# Patient Record
Sex: MALE | Race: WHITE | NOT HISPANIC OR LATINO | Employment: OTHER | URBAN - NONMETROPOLITAN AREA
[De-identification: names, ages, dates, MRNs, and addresses within clinical notes are randomized per-mention and may not be internally consistent; named-entity substitution may affect disease eponyms.]

---

## 2022-06-28 DIAGNOSIS — M54.2 NECK PAIN: ICD-10-CM

## 2022-06-28 DIAGNOSIS — M54.6 THORACIC SPINE PAIN: Primary | ICD-10-CM

## 2024-07-24 ENCOUNTER — LAB (OUTPATIENT)
Dept: LAB | Facility: HOSPITAL | Age: 64
End: 2024-07-24
Payer: COMMERCIAL

## 2024-07-24 ENCOUNTER — OFFICE VISIT (OUTPATIENT)
Dept: FAMILY MEDICINE CLINIC | Facility: CLINIC | Age: 64
End: 2024-07-24
Payer: COMMERCIAL

## 2024-07-24 VITALS
WEIGHT: 249.8 LBS | OXYGEN SATURATION: 97 % | BODY MASS INDEX: 33.11 KG/M2 | HEART RATE: 118 BPM | SYSTOLIC BLOOD PRESSURE: 120 MMHG | HEIGHT: 73 IN | DIASTOLIC BLOOD PRESSURE: 82 MMHG

## 2024-07-24 DIAGNOSIS — Z11.59 ENCOUNTER FOR HEPATITIS C SCREENING TEST FOR LOW RISK PATIENT: ICD-10-CM

## 2024-07-24 DIAGNOSIS — E55.9 VITAMIN D DEFICIENCY: ICD-10-CM

## 2024-07-24 DIAGNOSIS — Z13.89 SCREENING FOR BLOOD OR PROTEIN IN URINE: ICD-10-CM

## 2024-07-24 DIAGNOSIS — Z13.6 SCREENING FOR HYPERTENSION: ICD-10-CM

## 2024-07-24 DIAGNOSIS — E56.9 VITAMIN DEFICIENCY: ICD-10-CM

## 2024-07-24 DIAGNOSIS — Z13.0 SCREENING FOR DEFICIENCY ANEMIA: ICD-10-CM

## 2024-07-24 DIAGNOSIS — R53.83 LETHARGY: ICD-10-CM

## 2024-07-24 DIAGNOSIS — Z12.5 SCREENING PSA (PROSTATE SPECIFIC ANTIGEN): ICD-10-CM

## 2024-07-24 DIAGNOSIS — Z13.220 SCREENING FOR HYPERLIPIDEMIA: ICD-10-CM

## 2024-07-24 DIAGNOSIS — Z13.29 SCREENING FOR ENDOCRINE DISORDER: ICD-10-CM

## 2024-07-24 DIAGNOSIS — R53.83 LETHARGY: Primary | ICD-10-CM

## 2024-07-24 LAB
25(OH)D3 SERPL-MCNC: 44.3 NG/ML (ref 30–100)
ALBUMIN SERPL-MCNC: 4.9 G/DL (ref 3.5–5.2)
ALBUMIN/GLOB SERPL: 1.6 G/DL
ALP SERPL-CCNC: 82 U/L (ref 39–117)
ALT SERPL W P-5'-P-CCNC: 62 U/L (ref 1–41)
ANION GAP SERPL CALCULATED.3IONS-SCNC: 14 MMOL/L (ref 5–15)
AST SERPL-CCNC: 49 U/L (ref 1–40)
BASOPHILS # BLD AUTO: 0.03 10*3/MM3 (ref 0–0.2)
BASOPHILS NFR BLD AUTO: 0.5 % (ref 0–1.5)
BILIRUB SERPL-MCNC: 0.4 MG/DL (ref 0–1.2)
BILIRUB UR QL STRIP: NEGATIVE
BUN SERPL-MCNC: 22 MG/DL (ref 8–23)
BUN/CREAT SERPL: 15.4 (ref 7–25)
CALCIUM SPEC-SCNC: 10 MG/DL (ref 8.6–10.5)
CHLORIDE SERPL-SCNC: 102 MMOL/L (ref 98–107)
CHOLEST SERPL-MCNC: 185 MG/DL (ref 0–200)
CLARITY UR: CLEAR
CO2 SERPL-SCNC: 23 MMOL/L (ref 22–29)
COLOR UR: YELLOW
CREAT SERPL-MCNC: 1.43 MG/DL (ref 0.76–1.27)
CRP SERPL-MCNC: <0.3 MG/DL (ref 0–0.5)
DEPRECATED RDW RBC AUTO: 41.1 FL (ref 37–54)
EGFRCR SERPLBLD CKD-EPI 2021: 55.1 ML/MIN/1.73
EOSINOPHIL # BLD AUTO: 0.12 10*3/MM3 (ref 0–0.4)
EOSINOPHIL NFR BLD AUTO: 2.1 % (ref 0.3–6.2)
ERYTHROCYTE [DISTWIDTH] IN BLOOD BY AUTOMATED COUNT: 12.9 % (ref 12.3–15.4)
GLOBULIN UR ELPH-MCNC: 3.1 GM/DL
GLUCOSE SERPL-MCNC: 105 MG/DL (ref 65–99)
GLUCOSE UR STRIP-MCNC: NEGATIVE MG/DL
HBA1C MFR BLD: 6.1 % (ref 4.8–5.6)
HCT VFR BLD AUTO: 42.4 % (ref 37.5–51)
HCV AB SER QL: NORMAL
HDLC SERPL-MCNC: 55 MG/DL (ref 40–60)
HGB BLD-MCNC: 14.3 G/DL (ref 13–17.7)
HGB UR QL STRIP.AUTO: NEGATIVE
IMM GRANULOCYTES # BLD AUTO: 0.02 10*3/MM3 (ref 0–0.05)
IMM GRANULOCYTES NFR BLD AUTO: 0.3 % (ref 0–0.5)
KETONES UR QL STRIP: NEGATIVE
LDLC SERPL CALC-MCNC: 105 MG/DL (ref 0–100)
LDLC/HDLC SERPL: 1.84 {RATIO}
LEUKOCYTE ESTERASE UR QL STRIP.AUTO: NEGATIVE
LYMPHOCYTES # BLD AUTO: 1.85 10*3/MM3 (ref 0.7–3.1)
LYMPHOCYTES NFR BLD AUTO: 32.1 % (ref 19.6–45.3)
MCH RBC QN AUTO: 30 PG (ref 26.6–33)
MCHC RBC AUTO-ENTMCNC: 33.7 G/DL (ref 31.5–35.7)
MCV RBC AUTO: 88.9 FL (ref 79–97)
MONOCYTES # BLD AUTO: 0.69 10*3/MM3 (ref 0.1–0.9)
MONOCYTES NFR BLD AUTO: 12 % (ref 5–12)
NEUTROPHILS NFR BLD AUTO: 3.06 10*3/MM3 (ref 1.7–7)
NEUTROPHILS NFR BLD AUTO: 53 % (ref 42.7–76)
NITRITE UR QL STRIP: NEGATIVE
NRBC BLD AUTO-RTO: 0 /100 WBC (ref 0–0.2)
PH UR STRIP.AUTO: 5.5 [PH] (ref 5–8)
PLATELET # BLD AUTO: 234 10*3/MM3 (ref 140–450)
PMV BLD AUTO: 10.8 FL (ref 6–12)
POTASSIUM SERPL-SCNC: 4.5 MMOL/L (ref 3.5–5.2)
PROT SERPL-MCNC: 8 G/DL (ref 6–8.5)
PROT UR QL STRIP: ABNORMAL
PSA SERPL-MCNC: 7.98 NG/ML (ref 0–4)
RBC # BLD AUTO: 4.77 10*6/MM3 (ref 4.14–5.8)
SODIUM SERPL-SCNC: 139 MMOL/L (ref 136–145)
SP GR UR STRIP: 1.02 (ref 1–1.03)
TESTOST SERPL-MCNC: 118 NG/DL (ref 193–740)
TRIGL SERPL-MCNC: 143 MG/DL (ref 0–150)
TSH SERPL DL<=0.05 MIU/L-ACNC: 1.75 UIU/ML (ref 0.27–4.2)
URATE SERPL-MCNC: 6.8 MG/DL (ref 3.4–7)
UROBILINOGEN UR QL STRIP: ABNORMAL
VIT B12 BLD-MCNC: 496 PG/ML (ref 211–946)
VLDLC SERPL-MCNC: 25 MG/DL (ref 5–40)
WBC NRBC COR # BLD AUTO: 5.77 10*3/MM3 (ref 3.4–10.8)

## 2024-07-24 PROCEDURE — 82306 VITAMIN D 25 HYDROXY: CPT

## 2024-07-24 PROCEDURE — G0103 PSA SCREENING: HCPCS

## 2024-07-24 PROCEDURE — 83036 HEMOGLOBIN GLYCOSYLATED A1C: CPT

## 2024-07-24 PROCEDURE — 82607 VITAMIN B-12: CPT

## 2024-07-24 PROCEDURE — 84550 ASSAY OF BLOOD/URIC ACID: CPT

## 2024-07-24 PROCEDURE — 81003 URINALYSIS AUTO W/O SCOPE: CPT

## 2024-07-24 PROCEDURE — 99204 OFFICE O/P NEW MOD 45 MIN: CPT | Performed by: FAMILY MEDICINE

## 2024-07-24 PROCEDURE — 86803 HEPATITIS C AB TEST: CPT

## 2024-07-24 PROCEDURE — 80050 GENERAL HEALTH PANEL: CPT

## 2024-07-24 PROCEDURE — 86140 C-REACTIVE PROTEIN: CPT

## 2024-07-24 PROCEDURE — 80061 LIPID PANEL: CPT

## 2024-07-24 PROCEDURE — 84403 ASSAY OF TOTAL TESTOSTERONE: CPT

## 2024-07-24 RX ORDER — PANTOPRAZOLE SODIUM 40 MG/1
40 TABLET, DELAYED RELEASE ORAL DAILY
COMMUNITY

## 2024-07-24 RX ORDER — VENLAFAXINE HYDROCHLORIDE 75 MG/1
75 CAPSULE, EXTENDED RELEASE ORAL
COMMUNITY
Start: 2024-07-07

## 2024-07-24 RX ORDER — ROSUVASTATIN CALCIUM 10 MG/1
TABLET, COATED ORAL
COMMUNITY
Start: 2024-06-23

## 2024-07-24 RX ORDER — DICYCLOMINE HCL 20 MG
20 TABLET ORAL EVERY 6 HOURS
COMMUNITY

## 2024-07-24 NOTE — PROGRESS NOTES
New Patient Office Visit      Patient Name: Manuel Pineda  : 1960   MRN: [id number]     Chief Complaint:    Chief Complaint   Patient presents with    Establish Care    Fatigue     Pt. States he has noticed for the last month Tried Off and On.    Back Pain     X 2 days       Subjective   History of Present Illness    History of Present Illness: Manuel Pineda is a 64 y.o. male who is here today to establish care.    Previous primary care: Aleksey Shah MD    Chronic health conditions:  HLD: Rosuvastatin 10mg daily  HTN: Lisinopril 10mg. Alcohol 3-4 days per week, 2 drinks per occasion.  GERD: H/O Bender's esophagus  Elevated PSA: Mild, sees urology. Had ultrasound and biopsy years ago, normal.  Low back pain: Acute on chronic, previously saw Dr. Kumari in 2016  GI Issues: Hx of Bender's esophagus (pantoprazole), recent EGD looked normal. IBS, Had colonoscopy in May with Dr. Villanueva.  Anxiety with mild depression: Stable, does feel like he has plateaued a little bit. Venlafaxine XR 75mg daily, started about a year and a half ago (does correlate with before his symptoms started). Self medicates occasionally with THC. Previously on lexapro and cymbalta.  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  Prediabetes: A1c 5.7% previously    Other physicians currently involved in patient's care:  Patient Care Team:  Dallin Loza DO as PCP - General (Family Medicine)  Don Lucio MD (Urology)  Fred Villanueva MD as Consulting Physician (Gastroenterology)    Acute Concerns:  About a month ago he was trimming a hedge at his house, got very tired and had to sit down in his garage, took him a few days to fully recover. Eventually got back to neutral until this past weekend he was [place] the [place] with his kids and he could barely get up into the boat. He went to [facility] after the incident at his house, was sent to [facility] was given  fluids for dehydration, evidently everything else was normal.    C4 radiculopathy, saw a neurologist about a year or two ago. Bilateral but R>L. Saw an ENT in [place] a couple years ago for neck pain. Dr. Del Toro's office.    This patient is accompanied by their self who contributes to the history of their care.    The following portions of the patient's history were reviewed and updated as appropriate: allergies, current medications, past family history, past medical history, past social history, past surgical history and problem list.    Subjective      Review of Systems:   Review of Systems - See HPI and new patient paperwork scanned into chart    Past Medical History:   Past Medical History:   Diagnosis Date    Anxiety     Degenerative arthritis of left knee     Injection at ortho years ago    GERD (gastroesophageal reflux disease)     HLD (hyperlipidemia)     Hypertension        Past Surgical History:   Past Surgical History:   Procedure Laterality Date    APPENDECTOMY      TONSILLECTOMY         Family History:   Family History   Problem Relation Age of Onset    Multiple myeloma Mother     Hypertension Father     Skin cancer Father        Social History:   Social History     Socioeconomic History    Marital status: Single   Tobacco Use    Smoking status: Former     Current packs/day: 0.00     Average packs/day: 1 pack/day for 19.0 years (19.0 ttl pk-yrs)     Types: Cigarettes     Start date:      Quit date:      Years since quittin.6     Passive exposure: Never    Smokeless tobacco: Never   Vaping Use    Vaping status: Never Used   Substance and Sexual Activity    Alcohol use: Yes     Comment: Moderate    Drug use: No    Sexual activity: Defer       Tobacco History:   Social History     Tobacco Use   Smoking Status Former    Current packs/day: 0.00    Average packs/day: 1 pack/day for 19.0 years (19.0 ttl pk-yrs)    Types: Cigarettes    Start date:     Quit date: 2000    Years since quitting:  "24.6    Passive exposure: Never   Smokeless Tobacco Never       Medications:     Current Outpatient Medications:     ALPRAZolam (XANAX) 0.5 MG tablet, Take 1 tablet by mouth 2 (Two) Times a Day As Needed for Anxiety., Disp: , Rfl:     dicyclomine (BENTYL) 20 MG tablet, Take 1 tablet by mouth Every 6 (Six) Hours., Disp: , Rfl:     lisinopril (PRINIVIL,ZESTRIL) 10 MG tablet, Take 1 tablet by mouth Daily., Disp: , Rfl:     pantoprazole (PROTONIX) 40 MG EC tablet, Take 1 tablet by mouth Daily., Disp: , Rfl:     rosuvastatin (CRESTOR) 10 MG tablet, , Disp: , Rfl:     venlafaxine XR (EFFEXOR-XR) 75 MG 24 hr capsule, 1 capsule., Disp: , Rfl:     Allergies:   Allergies   Allergen Reactions    Hydrocodone      Itching       Objective   Objective     Physical Exam:  Vital Signs:   Vitals:    07/24/24 1012   BP: 120/82   Pulse: 118   SpO2: 97%   Weight: 113 kg (249 lb 12.8 oz)   Height: 185.4 cm (72.99\")   PainSc: 0-No pain     Body mass index is 32.96 kg/m².     Physical Exam  Nursing note reviewed  Const: NAD, A&Ox4, Pleasant, Cooperative  Eyes: EOMI, no conjunctivitis  ENT: No nasal discharge present, neck supple  Cardiac: Regular rate and rhythm, no cyanosis  Resp: Respiratory rate within normal limits, no increased work of breathing, no audible wheezing or retractions noted  GI: No distention or ascites  MSK: Motor and sensation grossly intact in bilateral upper extremities  Neurologic: CN II-XII grossly intact  Psych: Appropriate mood and behavior.  Skin: Warm, dry  Procedures/Radiology     Procedures  No radiology results for the last 7 days     Assessment & Plan   Assessment / Plan      Assessment/Plan:   Problems Addressed This Visit  Diagnoses and all orders for this visit:    1. Lethargy (Primary)  -     Lipid Panel; Future  -     Hemoglobin A1c; Future  -     Comprehensive Metabolic Panel; Future  -     CBC & Differential; Future  -     Urinalysis With Microscopic If Indicated (No Culture) - Urine, Clean Catch; " Future  -     TSH Rfx On Abnormal To Free T4; Future  -     Testosterone; Future  -     Uric Acid; Future  -     Vitamin D,25-Hydroxy; Future  -     Vitamin B12; Future  -     C-reactive Protein; Future    2. Screening for hyperlipidemia  -     Lipid Panel; Future    3. Screening for endocrine disorder  -     Hemoglobin A1c; Future  -     Comprehensive Metabolic Panel; Future  -     TSH Rfx On Abnormal To Free T4; Future  -     Testosterone; Future    4. Screening for deficiency anemia  -     CBC & Differential; Future    5. Screening for blood or protein in urine  -     Urinalysis With Microscopic If Indicated (No Culture) - Urine, Clean Catch; Future    6. Screening for hypertension  -     Uric Acid; Future    7. Screening PSA (prostate specific antigen)  -     PSA Screen; Future    8. Encounter for hepatitis C screening test for low risk patient  -     Hepatitis C Antibody; Future    9. Vitamin D deficiency  -     Vitamin D,25-Hydroxy; Future    10. Vitamin deficiency  -     Vitamin B12; Future      Problem List Items Addressed This Visit    None  Visit Diagnoses       Lethargy    -  Primary    Relevant Orders    Lipid Panel (Completed)    Hemoglobin A1c (Completed)    Comprehensive Metabolic Panel (Completed)    CBC & Differential (Completed)    Urinalysis With Microscopic If Indicated (No Culture) - Urine, Clean Catch (Completed)    TSH Rfx On Abnormal To Free T4 (Completed)    Testosterone (Completed)    Uric Acid (Completed)    Vitamin D,25-Hydroxy (Completed)    Vitamin B12 (Completed)    C-reactive Protein (Completed)    Screening for hyperlipidemia        Relevant Orders    Lipid Panel (Completed)    Screening for endocrine disorder        Relevant Orders    Hemoglobin A1c (Completed)    Comprehensive Metabolic Panel (Completed)    TSH Rfx On Abnormal To Free T4 (Completed)    Testosterone (Completed)    Screening for deficiency anemia        Relevant Orders    CBC & Differential (Completed)    Screening  for blood or protein in urine        Relevant Orders    Urinalysis With Microscopic If Indicated (No Culture) - Urine, Clean Catch (Completed)    Screening for hypertension        Relevant Orders    Uric Acid (Completed)    Screening PSA (prostate specific antigen)        Relevant Orders    PSA Screen (Completed)    Encounter for hepatitis C screening test for low risk patient        Relevant Orders    Hepatitis C Antibody (Completed)    Vitamin D deficiency        Relevant Orders    Vitamin D,25-Hydroxy (Completed)    Vitamin deficiency        Relevant Orders    Vitamin B12 (Completed)            We will plan to obtain previous records both for chronic preventative care as well as those related to the current episode of care.  Any records that the patient brought with him today were reviewed personally by me during the visit today and will be scanned into the chart for posterity.    Discussed the nature of the disease including relevant anatomy & expected clinical course, risks, complications, implications, management, safe and proper use of medications. Plan of care reviewed with patient at the conclusion of today's visit. Education was provided regarding diagnosis and management.  Patient verbalizes understanding of and agreement with management plan. Encouraged therapeutic lifestyle changes including low calorie diet with plenty of fruits and vegetables, daily exercise, medication compliance, and keeping scheduled follow up appointments with me and any other providers. Encouraged patient to have appointment for complete physical, fasting labs, appropriate screenings, and immunizations on an annual basis. Discussed extended office hours, shared call, and appropriate use of the ER. Discussed generally we do not prescribe chronic controlled substances from this office. Appropriate referrals will be made to pain management and psychiatry if needed. Stressed the importance and expectation of medical compliance with  plan of care, medications, and follow up appointments.    There are no Patient Instructions on file for this visit.    Follow Up:   Return in about 2 weeks (around 8/7/2024) for lab review.        DO KACI Billings RD  [facility]  [address]  [facility] [address]  Fax [phone]  Phone [phone]

## 2024-07-26 ENCOUNTER — PATIENT ROUNDING (BHMG ONLY) (OUTPATIENT)
Dept: FAMILY MEDICINE CLINIC | Facility: CLINIC | Age: 64
End: 2024-07-26
Payer: COMMERCIAL

## 2024-08-12 ENCOUNTER — OFFICE VISIT (OUTPATIENT)
Dept: FAMILY MEDICINE CLINIC | Facility: CLINIC | Age: 64
End: 2024-08-12
Payer: COMMERCIAL

## 2024-08-12 VITALS
SYSTOLIC BLOOD PRESSURE: 116 MMHG | OXYGEN SATURATION: 97 % | HEIGHT: 73 IN | BODY MASS INDEX: 32.84 KG/M2 | DIASTOLIC BLOOD PRESSURE: 80 MMHG | HEART RATE: 88 BPM | WEIGHT: 247.8 LBS

## 2024-08-12 DIAGNOSIS — E66.09 CLASS 1 OBESITY DUE TO EXCESS CALORIES WITH SERIOUS COMORBIDITY AND BODY MASS INDEX (BMI) OF 32.0 TO 32.9 IN ADULT: ICD-10-CM

## 2024-08-12 DIAGNOSIS — R53.83 LETHARGY: Primary | ICD-10-CM

## 2024-08-12 DIAGNOSIS — N41.0 ACUTE PROSTATITIS: ICD-10-CM

## 2024-08-12 DIAGNOSIS — R79.89 LOW SERUM TESTOSTERONE LEVEL IN MALE: ICD-10-CM

## 2024-08-12 DIAGNOSIS — M25.511 ACUTE PAIN OF RIGHT SHOULDER: ICD-10-CM

## 2024-08-12 DIAGNOSIS — R79.89 ELEVATED SERUM CREATININE: ICD-10-CM

## 2024-08-12 DIAGNOSIS — R74.8 ELEVATED LIVER ENZYMES: ICD-10-CM

## 2024-08-12 PROCEDURE — 99214 OFFICE O/P EST MOD 30 MIN: CPT | Performed by: FAMILY MEDICINE

## 2024-08-12 RX ORDER — VIT C/B6/B5/MAGNESIUM/HERB 173 50-5-6-5MG
500 CAPSULE ORAL 3 TIMES DAILY
Qty: 90 CAPSULE | Refills: 11 | Status: SHIPPED | OUTPATIENT
Start: 2024-08-12 | End: 2024-08-19

## 2024-08-12 NOTE — PATIENT INSTRUCTIONS
See nutritionist for weight loss  Avoid alcohol, hydrate, and have labs rechecked in 1 month  Have testosterone checked first thing in the morning next week  If the weight isn't coming down in the next 6 weeks we can try Wegovy or Zepbound

## 2024-08-12 NOTE — PROGRESS NOTES
Established Patient Office Visit      Patient Name: Manuel Pineda  : 1960   MRN: [id number]   Care Team: Patient Care Team:  Dallin Loza DO as PCP - General (Family Medicine)  Don Lucio MD (Urology)  Fred Villanueva MD as Consulting Physician (Gastroenterology)  Migel Vargas MD as Surgeon (Orthopedic Surgery)    Chief Complaint:    Chief Complaint   Patient presents with    Hypertension       History of Present Illness: Manuel Pineda is a 64 y.o. male who is here today for chief complaint.    HPI    Follow-up on the labs he had completed after establishing care.    Chronic health conditions:  HLD: Rosuvastatin 10mg daily  HTN: Lisinopril 10mg. Alcohol 3-4 days per week, 2 drinks per occasion.  GERD: H/O Bender's esophagus  Elevated PSA: Mild, sees urology. Had ultrasound and biopsy years ago, normal.  Low back pain: Acute on chronic, previously saw Dr. Kumari in   GI Issues: Hx of Bender's esophagus (pantoprazole), recent EGD looked normal. IBS, Had colonoscopy in May with Dr. Villanueva.  Anxiety with mild depression: Stable, does feel like he has plateaued a little bit. Venlafaxine XR 75mg daily, started about a year and a half ago (does correlate with before his symptoms started). Self medicates occasionally with THC. Previously on lexapro and cymbalta.  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  Prediabetes: A1c 5.7% previously    He had fasting labs completed which showed  An elevated PSA to 7.98--he saw his urologist for possible episode of prostatitis, prescribed Cipro twice daily for 21 days  Very low testosterone at 118  Trace proteinuria  Hemoglobin A1c 6.1%, up from his previous level of 5.7% (fasting glucose 105)  Elevated creatinine to 1.43  Elevated ALT T and AST 62/49 with normal liver enzymes otherwise    Had EGD colonoscopy May 2024 with Dr. Villanueva    This patient is accompanied by their  self who contributes to the history of their care.    The following portions of the patient's history were reviewed and updated as appropriate: allergies, current medications, past family history, past medical history, past social history, past surgical history and problem list.    Subjective      Review of Systems:   Review of Systems - See HPI    Past Medical History:   Past Medical History:   Diagnosis Date    Anxiety     Arthritis of back 2015    Cervical disc disorder     Degenerative arthritis of left knee     Injection at ortho years ago    GERD (gastroesophageal reflux disease)     HLD (hyperlipidemia)     Hypertension     Lumbosacral disc disease        Past Surgical History:   Past Surgical History:   Procedure Laterality Date    APPENDECTOMY      TONSILLECTOMY         Family History:   Family History   Problem Relation Age of Onset    Multiple myeloma Mother     Hypertension Father     Skin cancer Father        Social History:   Social History     Socioeconomic History    Marital status: Single   Tobacco Use    Smoking status: Former     Current packs/day: 0.00     Average packs/day: 1 pack/day for 19.0 years (19.0 ttl pk-yrs)     Types: Cigarettes     Start date: 1981     Quit date:      Years since quittin.6     Passive exposure: Never    Smokeless tobacco: Never   Vaping Use    Vaping status: Never Used   Substance and Sexual Activity    Alcohol use: Not Currently     Comment: Moderate    Drug use: No    Sexual activity: Defer       Tobacco History:   Social History     Tobacco Use   Smoking Status Former    Current packs/day: 0.00    Average packs/day: 1 pack/day for 19.0 years (19.0 ttl pk-yrs)    Types: Cigarettes    Start date: 1981    Quit date:     Years since quittin.6    Passive exposure: Never   Smokeless Tobacco Never       Medications:   Outpatient Medications Prior to Visit   Medication Sig Dispense Refill    ALPRAZolam (XANAX) 0.5 MG tablet Take 1 tablet  "by mouth 2 (Two) Times a Day As Needed for Anxiety.      dicyclomine (BENTYL) 20 MG tablet Take 1 tablet by mouth Every 6 (Six) Hours.      lisinopril (PRINIVIL,ZESTRIL) 10 MG tablet Take 1 tablet by mouth Daily.      pantoprazole (PROTONIX) 40 MG EC tablet Take 1 tablet by mouth Daily.      rosuvastatin (CRESTOR) 10 MG tablet       venlafaxine XR (EFFEXOR-XR) 75 MG 24 hr capsule 1 capsule.       No facility-administered medications prior to visit.        Allergies:   Allergies   Allergen Reactions    Hydrocodone      Itching       Objective   Objective     Physical Exam:  Vital Signs:   Vitals:    08/12/24 1302   BP: 116/80   Pulse: 88   SpO2: 97%   Weight: 112 kg (247 lb 12.8 oz)   Height: 185.4 cm (72.99\")   PainSc: 0-No pain     Body mass index is 32.7 kg/m².     Physical Exam  Nursing note reviewed  Const: NAD, A&Ox4, Pleasant, Cooperative  Eyes: EOMI, no conjunctivitis  ENT: No nasal discharge present, neck supple  Cardiac: Regular rate and rhythm, no cyanosis  Resp: Respiratory rate within normal limits, no increased work of breathing, no audible wheezing or retractions noted  GI: No distention or ascites  MSK: Motor and sensation grossly intact in bilateral upper extremities  Neurologic: CN II-XII grossly intact  Psych: Appropriate mood and behavior.  Skin: Warm, dry  Procedures/Radiology     Procedures  No radiology results for the last 7 days     Assessment & Plan   Assessment / Plan      Assessment/Plan:   Problems Addressed This Visit  Diagnoses and all orders for this visit:    1. Lethargy (Primary)    2. Elevated serum creatinine  -     Comprehensive Metabolic Panel; Future  -     Creatinine Urine Random (kidney function) GFR component - Urine, Clean Catch; Future  -     Sodium, Urine, Random - Urine, Clean Catch; Future  -     Osmolality, Urine - Urine, Clean Catch; Future  -     Osmolality, Serum; Future    3. Low serum testosterone level in male  -     Testosterone, Free, Total; Future    4. Class 1 " obesity due to excess calories with serious comorbidity and body mass index (BMI) of 32.0 to 32.9 in adult  -     Ambulatory Referral to Nutrition Services    5. Acute prostatitis    6. Elevated liver enzymes    7. Acute pain of right shoulder  -     Ambulatory Referral to Orthopedic Surgery  -     Discontinue: Turmeric 500 MG capsule; Take 1 capsule by mouth 3 times a day. For inflammation/pain  Dispense: 90 capsule; Refill: 11      Problem List Items Addressed This Visit    None  Visit Diagnoses       Lethargy    -  Primary    Elevated serum creatinine        Relevant Orders    Comprehensive Metabolic Panel    Creatinine Urine Random (kidney function) GFR component - Urine, Clean Catch    Sodium, Urine, Random - Urine, Clean Catch    Osmolality, Urine - Urine, Clean Catch    Osmolality, Serum    Low serum testosterone level in male        Relevant Orders    Testosterone, Free, Total    Class 1 obesity due to excess calories with serious comorbidity and body mass index (BMI) of 32.0 to 32.9 in adult        Relevant Orders    Ambulatory Referral to Nutrition Services    Acute prostatitis        Elevated liver enzymes        Acute pain of right shoulder        Relevant Orders    Ambulatory Referral to Orthopedic Surgery          Orders Placed This Encounter   Procedures    Testosterone, Free, Total     Standing Status:   Future     Standing Expiration Date:   8/12/2025     Order Specific Question:   Release to patient     Answer:   Routine Release [[id number]]    Comprehensive Metabolic Panel     Standing Status:   Future     Standing Expiration Date:   8/12/2025     Order Specific Question:   Release to patient     Answer:   Routine Release [[id number]]    Creatinine Urine Random (kidney function) GFR component - Urine, Clean Catch     Standing Status:   Future     Standing Expiration Date:   8/12/2025     Order Specific Question:   Release to patient     Answer:   Routine Release [[id number]]    Sodium, Urine,  Random - Urine, Clean Catch     Standing Status:   Future     Standing Expiration Date:   8/12/2025     Order Specific Question:   Release to patient     Answer:   Routine Release [[id number]]    Osmolality, Urine - Urine, Clean Catch     Standing Status:   Future     Standing Expiration Date:   8/12/2025     Order Specific Question:   Release to patient     Answer:   Routine Release [[id number]]    Osmolality, Serum     Standing Status:   Future     Standing Expiration Date:   8/12/2025     Order Specific Question:   Release to patient     Answer:   Routine Release [[id number]]    Ambulatory Referral to Nutrition Services     Referral Priority:   Routine     Referral Type:   Health Education     Referral Reason:   Specialty Services Required     Number of Visits Requested:   1    Ambulatory Referral to Orthopedic Surgery     Referral Priority:   Routine     Referral Type:   Consultation     Referral Reason:   Specialty Services Required     Requested Specialty:   Orthopedic Surgery     Number of Visits Requested:   1         Patient Instructions   See nutritionist for weight loss  Avoid alcohol, hydrate, and have labs rechecked in 1 month  Have testosterone checked first thing in the morning next week  If the weight isn't coming down in the next 6 weeks we can try Wegovy or Zepbound    Follow Up:   Return in about 6 weeks (around 9/23/2024) for weight loss.        DO KACI Billings RD  [facility]  [address] [facility] [phone]  Fax [phone]  Phone [phone]    Disclaimer to patients: The 21st Century Cares Act makes medical notes like these available to patients in the interest of transparency. However, please be advised that this is still a medical document. It is intended as [id number] communication. Many sections may include medical language or jargon, abbreviations, and additional verbiage that are unfamiliar or  confusing. In some ways it may come across as blunt, direct, or may be summarized in order to clearly and concisely communicate the most crucial information to medical professionals. It may also include mentions of conditions that are unlikely but considered as part of the differential diagnosis, including serious disorders. These are not always discussed at length at the time of appointment because their likelihood is so low, but may be included in a medical note to make it clear what has been considered and/or ruled out as part of a work-up. Medical documents are intended to carry relevant information, facts as evident, and the personal clinical opinion of the physician. If you have any questions regarding this medical document, please bring them to the attention of the physician at your next scheduled appointment.

## 2024-08-19 ENCOUNTER — OFFICE VISIT (OUTPATIENT)
Age: 64
End: 2024-08-19
Payer: COMMERCIAL

## 2024-08-19 VITALS
WEIGHT: 242.9 LBS | BODY MASS INDEX: 32.19 KG/M2 | DIASTOLIC BLOOD PRESSURE: 76 MMHG | HEIGHT: 73 IN | SYSTOLIC BLOOD PRESSURE: 110 MMHG

## 2024-08-19 DIAGNOSIS — M50.30 DEGENERATIVE CERVICAL DISC: ICD-10-CM

## 2024-08-19 DIAGNOSIS — M25.511 RIGHT SHOULDER PAIN, UNSPECIFIED CHRONICITY: Primary | ICD-10-CM

## 2024-08-19 DIAGNOSIS — M75.81 RIGHT ROTATOR CUFF TENDINITIS: ICD-10-CM

## 2024-08-19 PROCEDURE — 99204 OFFICE O/P NEW MOD 45 MIN: CPT | Performed by: STUDENT IN AN ORGANIZED HEALTH CARE EDUCATION/TRAINING PROGRAM

## 2024-08-19 PROCEDURE — 20610 DRAIN/INJ JOINT/BURSA W/O US: CPT | Performed by: STUDENT IN AN ORGANIZED HEALTH CARE EDUCATION/TRAINING PROGRAM

## 2024-08-19 RX ORDER — LIDOCAINE HYDROCHLORIDE 10 MG/ML
4 INJECTION, SOLUTION EPIDURAL; INFILTRATION; INTRACAUDAL; PERINEURAL
Status: COMPLETED | OUTPATIENT
Start: 2024-08-19 | End: 2024-08-19

## 2024-08-19 RX ORDER — TRIAMCINOLONE ACETONIDE 40 MG/ML
80 INJECTION, SUSPENSION INTRA-ARTICULAR; INTRAMUSCULAR
Status: COMPLETED | OUTPATIENT
Start: 2024-08-19 | End: 2024-08-19

## 2024-08-19 RX ORDER — BUPIVACAINE HYDROCHLORIDE 5 MG/ML
4 INJECTION, SOLUTION EPIDURAL; INTRACAUDAL
Status: COMPLETED | OUTPATIENT
Start: 2024-08-19 | End: 2024-08-19

## 2024-08-19 RX ADMIN — LIDOCAINE HYDROCHLORIDE 4 ML: 10 INJECTION, SOLUTION EPIDURAL; INFILTRATION; INTRACAUDAL; PERINEURAL at 15:20

## 2024-08-19 RX ADMIN — TRIAMCINOLONE ACETONIDE 80 MG: 40 INJECTION, SUSPENSION INTRA-ARTICULAR; INTRAMUSCULAR at 15:20

## 2024-08-19 RX ADMIN — BUPIVACAINE HYDROCHLORIDE 4 ML: 5 INJECTION, SOLUTION EPIDURAL; INTRACAUDAL at 15:20

## 2024-08-19 NOTE — PROGRESS NOTES
Procedure   - Large Joint Arthrocentesis: R subacromial bursa on 8/19/2024 3:20 PM  Indications: pain  Details: 21 G needle, posterior approach  Medications: 4 mL lidocaine PF 1% 1 %; 4 mL bupivacaine (PF) 0.5 %; 80 mg triamcinolone acetonide 40 MG/ML  Outcome: tolerated well, no immediate complications  Procedure, treatment alternatives, risks and benefits explained, specific risks discussed. Consent was given by the patient. Immediately prior to procedure a time out was called to verify the correct patient, procedure, equipment, support staff and site/side marked as required. Patient was prepped and draped in the usual sterile fashion.

## 2024-08-19 NOTE — PROGRESS NOTES
[facility] [place] [facility] [place]     Office Visit       Date: 08/19/2024   Patient Name: Manuel Pineda  MRN: [id number]  YOB: 1960    Referring Physician: Dallin Loza DO     Chief Complaint:   Chief Complaint   Patient presents with    Right Shoulder - Pain       History of Present Illness:   Manuel Pineda is a 64 y.o. male who presents with new problem of: right shoulder pain.  Onset: atraumatic and gradual in nature. The issue has been ongoing for 2 year(s). Pain is a 5/10 on the pain scale. Pain is described as dull, aching, throbbing, and stabbing. Associated symptoms include pain, popping, and giving way/buckling. The pain is worse with sleeping and lying on affected side; ice and pain medication and/or NSAID improve the pain. Previous treatments have included: NSAIDS and physical therapy.    Subjective   Review of Systems: Review of Systems   Constitutional:  Negative for chills, fever, unexpected weight gain and unexpected weight loss.   HENT:  Negative for congestion, postnasal drip and rhinorrhea.    Eyes:  Negative for blurred vision.   Respiratory:  Negative for shortness of breath.    Cardiovascular:  Negative for leg swelling.   Gastrointestinal:  Negative for abdominal pain, nausea and vomiting.   Genitourinary:  Negative for difficulty urinating.   Musculoskeletal:  Positive for arthralgias. Negative for gait problem, joint swelling and myalgias.   Skin:  Negative for skin lesions and wound.   Neurological:  Negative for dizziness, weakness, light-headedness and numbness.   Hematological:  Does not bruise/bleed easily.   Psychiatric/Behavioral:  Negative for depressed mood.         I have reviewed the following portions of the patient's history:History of Present Illness and review of systems.    Past Medical History:   Past Medical History:   Diagnosis Date    Anxiety     Arthritis of back 2015    Cervical disc disorder 2022     Degenerative arthritis of left knee     Injection at ortho years ago    GERD (gastroesophageal reflux disease)     HLD (hyperlipidemia)     Hypertension     Lumbosacral disc disease        Past Surgical History:   Past Surgical History:   Procedure Laterality Date    APPENDECTOMY      TONSILLECTOMY         Family History:   Family History   Problem Relation Age of Onset    Multiple myeloma Mother     Hypertension Father     Skin cancer Father        Social History:   Social History     Socioeconomic History    Marital status: Single   Tobacco Use    Smoking status: Former     Current packs/day: 0.00     Average packs/day: 1 pack/day for 19.0 years (19.0 ttl pk-yrs)     Types: Cigarettes     Start date: 1981     Quit date:      Years since quittin.6     Passive exposure: Never    Smokeless tobacco: Never   Vaping Use    Vaping status: Never Used   Substance and Sexual Activity    Alcohol use: Not Currently     Comment: Moderate    Drug use: No    Sexual activity: Defer       Medications:   Current Outpatient Medications:     ALPRAZolam (XANAX) 0.5 MG tablet, Take 1 tablet by mouth 2 (Two) Times a Day As Needed for Anxiety., Disp: , Rfl:     dicyclomine (BENTYL) 20 MG tablet, Take 1 tablet by mouth Every 6 (Six) Hours., Disp: , Rfl:     lisinopril (PRINIVIL,ZESTRIL) 10 MG tablet, Take 1 tablet by mouth Daily., Disp: , Rfl:     pantoprazole (PROTONIX) 40 MG EC tablet, Take 1 tablet by mouth Daily., Disp: , Rfl:     rosuvastatin (CRESTOR) 10 MG tablet, , Disp: , Rfl:     venlafaxine XR (EFFEXOR-XR) 75 MG 24 hr capsule, 1 capsule., Disp: , Rfl:     Allergies:   Allergies   Allergen Reactions    Hydrocodone      Itching       I reviewed the patient's chief complaint, history of present illness, review of systems, past medical history, surgical history, family history, social history, medications and allergy list.     Objective    Vital Signs:   Vitals:    24 1439   BP: 110/76   Weight: 110 kg (242  "lb 14.4 oz)   Height: 185.4 cm (73\")     Body mass index is 32.05 kg/m².   BMI is >= 30 and <35. (Class 1 Obesity). The following options were offered after discussion;: exercise counseling/recommendations and nutrition counseling/recommendations      Patient reports that she is a former smoker. She quit smoking in 2000.  She has not resumed smoking since that time.  This behavior was applauded and she was encouraged to continue in smoking cessation.  We will continue to monitor at subsequent visits.    Ortho Exam:  Cardiovascular System: Arterial Pulses Right: radial normal. Arterial Pulses Left: radial normal.   C-Spine/Neck: Active Range of Motion: + crepitus and pain elicited on motion and flexion normal, extension normal, rotation normal, and lateral flexion normal.   Shoulders: Inspection Right: no misalignment, erythema, induration, swelling, or warmth and AC prominence normal. Inspection Left: no misalignment, erythema, induration, swelling, or warmth and AC prominence normal. Bony Palpation Right: tenderness of the acromioclavicular joint, the greater tuberosity, and the bicipital groove and no tenderness of the clavicle. Soft Tissue Palpation Right: tenderness of the supraspinatus, the infraspinatus, the glenohumeral joint region, and the lateral cuff insertion. Active Range of Motion Right: limited. Special Tests Right: Hawkin's test positive and empty can sign positive.   exam RIGHT shoulder: forward flexion approximately 140 degrees. Abduction 140 degrees. Internal rotation SI. Motor testing supraspinatus 4/5  exam LEFT shoulder: forward flexion approximately 140 degrees. Abduction 140 degrees. Internal rotation L1. Motor testing supraspinatus 5/5     Results Review:   Imaging Results (Last 24 Hours)       Procedure Component Value Units Date/Time    XR Shoulder 2+ View Right [[id number]] Resulted: 08/19/24 1656     Updated: 08/19/24 1707    Narrative:      Indication: Right shoulder pain.     Views: AP " lateral and scapular Y views of the shoulder are submitted.    Impression: There is no fracture subluxation or dislocation. The   glenohumeral joint space is well reduced with large osteophytes. There are   no acute findings. Mild degenerative changes of the GH joint.    Comparison: No comparison images available.               Procedures    Assessment / Plan    Assessment/Plan:   Diagnoses and all orders for this visit:    1. Right shoulder pain, unspecified chronicity (Primary)  -     XR Shoulder 2+ View Right    2. Right rotator cuff tendinitis    3. Degenerative cervical disc  -     Ambulatory Referral to Pain Management Clinic    Other orders  -     - Large Joint Arthrocentesis: R subacromial bursa    Patient presents today with multiple issues related to his shoulder and cervical spine.  He notes pain and stiffness over the medial scapular border with pain into the occiput of his skull.  Previous MRI from 2022 showed multilevel cervical degenerative disc disease.  We discussed this finding and how it can make the surrounding musculature quite pain and stiff.  I recommended referral to see Dr. Rojas for this problem.  He also is dealing with lateral shoulder pain.  Radiographs of his right shoulder do show mild glenohumeral joint arthritis.  However, he is suffering from tendinitis of the rotator cuff.  Today, we will give him a series of home exercises along with resistance bands.  We will inject the subacromial bursa with corticosteroid.  He will follow-up as his symptoms dictate.    Previous documentation reviewed: 8/12/2024-office visit-Dallin Loza DO.    Previous imaging studies reviewed: 6/30/2022-MRI cervical spine.    Previous laboratory results reviewed: 7/24/2024-hemoglobin A1c 6.10%.    Follow Up:   No follow-ups on file.      Jun Vargas MD  [facility] [place] [facility]

## 2024-08-26 ENCOUNTER — LAB (OUTPATIENT)
Dept: LAB | Facility: HOSPITAL | Age: 64
End: 2024-08-26
Payer: COMMERCIAL

## 2024-08-26 ENCOUNTER — TRANSCRIBE ORDERS (OUTPATIENT)
Dept: LAB | Facility: HOSPITAL | Age: 64
End: 2024-08-26
Payer: COMMERCIAL

## 2024-08-26 DIAGNOSIS — R97.20 ELEVATED PROSTATE SPECIFIC ANTIGEN (PSA): Primary | ICD-10-CM

## 2024-08-26 DIAGNOSIS — R97.20 ELEVATED PROSTATE SPECIFIC ANTIGEN (PSA): ICD-10-CM

## 2024-08-26 DIAGNOSIS — R79.89 LOW SERUM TESTOSTERONE LEVEL IN MALE: ICD-10-CM

## 2024-08-26 DIAGNOSIS — R79.89 ELEVATED SERUM CREATININE: ICD-10-CM

## 2024-08-26 LAB
ALBUMIN SERPL-MCNC: 5.1 G/DL (ref 3.5–5.2)
ALBUMIN/GLOB SERPL: 1.6 G/DL
ALP SERPL-CCNC: 79 U/L (ref 39–117)
ALT SERPL W P-5'-P-CCNC: 47 U/L (ref 1–41)
ANION GAP SERPL CALCULATED.3IONS-SCNC: 14 MMOL/L (ref 5–15)
AST SERPL-CCNC: 30 U/L (ref 1–40)
BILIRUB SERPL-MCNC: 0.4 MG/DL (ref 0–1.2)
BUN SERPL-MCNC: 30 MG/DL (ref 8–23)
BUN/CREAT SERPL: 20.3 (ref 7–25)
CALCIUM SPEC-SCNC: 10.7 MG/DL (ref 8.6–10.5)
CHLORIDE SERPL-SCNC: 100 MMOL/L (ref 98–107)
CO2 SERPL-SCNC: 26 MMOL/L (ref 22–29)
CREAT SERPL-MCNC: 1.48 MG/DL (ref 0.76–1.27)
EGFRCR SERPLBLD CKD-EPI 2021: 52.8 ML/MIN/1.73
GLOBULIN UR ELPH-MCNC: 3.2 GM/DL
GLUCOSE SERPL-MCNC: 108 MG/DL (ref 65–99)
OSMOLALITY SERPL: 301 MOSM/KG (ref 280–301)
POTASSIUM SERPL-SCNC: 4.6 MMOL/L (ref 3.5–5.2)
PROT SERPL-MCNC: 8.3 G/DL (ref 6–8.5)
PSA SERPL-MCNC: 6.51 NG/ML (ref 0–4)
SODIUM SERPL-SCNC: 140 MMOL/L (ref 136–145)

## 2024-08-26 PROCEDURE — 82570 ASSAY OF URINE CREATININE: CPT

## 2024-08-26 PROCEDURE — 36415 COLL VENOUS BLD VENIPUNCTURE: CPT

## 2024-08-26 PROCEDURE — 80053 COMPREHEN METABOLIC PANEL: CPT

## 2024-08-26 PROCEDURE — 84300 ASSAY OF URINE SODIUM: CPT

## 2024-08-26 PROCEDURE — 83930 ASSAY OF BLOOD OSMOLALITY: CPT

## 2024-08-26 PROCEDURE — 84403 ASSAY OF TOTAL TESTOSTERONE: CPT

## 2024-08-26 PROCEDURE — 84402 ASSAY OF FREE TESTOSTERONE: CPT

## 2024-08-26 PROCEDURE — 84153 ASSAY OF PSA TOTAL: CPT

## 2024-08-26 PROCEDURE — 83935 ASSAY OF URINE OSMOLALITY: CPT

## 2024-08-27 LAB
CREAT UR-MCNC: 158.4 MG/DL
OSMOLALITY UR: 765 MOSM/KG
SODIUM UR-SCNC: 95 MMOL/L

## 2024-08-29 LAB
TESTOST FREE SERPL-MCNC: 1 PG/ML (ref 6.6–18.1)
TESTOST SERPL-MCNC: 150 NG/DL (ref 264–916)

## 2024-09-06 ENCOUNTER — HOSPITAL ENCOUNTER (OUTPATIENT)
Dept: NUTRITION | Facility: HOSPITAL | Age: 64
Setting detail: RECURRING SERIES
Discharge: HOME | End: 2024-09-06

## 2024-09-06 PROCEDURE — 97802 MEDICAL NUTRITION INDIV IN: CPT

## 2024-09-06 NOTE — CONSULTS
[facility] Nutrition Services          Initial 60 Minute Nutrition Visit    Date: 2024   Patient Name: Manuel Pineda  : 1960   MRN: [id number]   Referring Provider: Dallin Loza DO    Reason for Visit: weight loss  Visit Format: Teams    Nutrition Assessment       Social History:   Social History     Socioeconomic History    Marital status: Single   Tobacco Use    Smoking status: Former     Current packs/day: 0.00     Average packs/day: 1 pack/day for 19.0 years (19.0 ttl pk-yrs)     Types: Cigarettes     Start date: 1981     Quit date:      Years since quittin.6     Passive exposure: Never    Smokeless tobacco: Never   Vaping Use    Vaping status: Never Used   Substance and Sexual Activity    Alcohol use: Not Currently     Comment: Moderate    Drug use: No    Sexual activity: Defer     Active Problem List:   Patient Active Problem List    Diagnosis     Bilateral low back pain with sciatica [M54.40]       Current Medications:   Current Outpatient Medications:     ALPRAZolam (XANAX) 0.5 MG tablet, Take 1 tablet by mouth 2 (Two) Times a Day As Needed for Anxiety., Disp: , Rfl:     dicyclomine (BENTYL) 20 MG tablet, Take 1 tablet by mouth Every 6 (Six) Hours., Disp: , Rfl:     lisinopril (PRINIVIL,ZESTRIL) 10 MG tablet, Take 1 tablet by mouth Daily., Disp: , Rfl:     pantoprazole (PROTONIX) 40 MG EC tablet, Take 1 tablet by mouth Daily., Disp: , Rfl:     rosuvastatin (CRESTOR) 10 MG tablet, , Disp: , Rfl:     venlafaxine XR (EFFEXOR-XR) 75 MG 24 hr capsule, 1 capsule., Disp: , Rfl:     Labs: n/a    Hunger Vital Sign Food Insecurity Assessment:  Within the past 12 months I/we worried whether our food would run out before I/we got money to buy more: no   Within the past 12 months the food I/we bought just didn't last and I/we didn't have money to get more: no   Use of food assistance programs (WIC, food stamps, food rooney) no       Food & Nutrition Related History      "  Food Allergies: none  Food Intolerances: none  Food Behavior: boredom eating  Nutrition Impact Symptoms:  none  Gastrointestinal conditions that impact intake or food choices: none  Details at home: n/a  Who prepares most meals: patient   Who does grocery shopping: patient   How many meals are purchased from fast food/sit down restaurants per week: 2  Difficulty chewin - Normal  Difficulty swallowin - Normal  Diet requirement related to personal preference or cultural belief: in general, a \"healthy\" diet  , Mediterranean or DASH diet  History of eating disorder/disordered eating habits: None  Language/communication details: English  Barriers to learning: No barriers identified at this time        Anthropometrics      Height:   Ht Readings from Last 1 Encounters:   24 185.4 cm (73\")     Weight:   Wt Readings from Last 3 Encounters:   24 110 kg (242 lb 14.4 oz)   24 112 kg (247 lb 12.8 oz)   24 113 kg (249 lb 12.8 oz)     BMI: There is no height or weight on file to calculate BMI.   Weight Change: n/a     Physical Activity       Barriers to physical activity: none identified     Physical activity comments: Daily Activities      Estimated Needs     Estimated Energy Needs: not assessed at this time     Estimated Protein Needs: n/a     Estimated Fluid Needs: n/a     Discussion / Education      Patient is a 64 year old male referred for weight loss. Patient states that he has been struggling to lose weight. He states that he is having joint pain that is being caused from the weight. He states that he struggles with meal planning and choosing convenient options. He does like to cook but does not want to cook for one person every meal. States that he does struggle with portion sizes.     Education provided today primarily focused on weight management. Educated patient on the three macronutrients and what each do for our body. Discussed the difference between saturated and unsaturated fat. " Discussed how to build a healthy plate by using the plate method. Explained the importance of portion control and balanced meals. Discussed the importance of fiber for GI health, satiety, and lowering cholesterol levels. Discussed healthy snack options and quick meal ideas. Discussed different mindful eating strategies to help make more conscious decisions during meal and snack times. Discussed the Mediterranean diet to help with joint pain and choosing more anti-inflammatory foods while limiting inflammatory foods. Encouraged patient to reach out with any additional questions or concerns.     Assessment of patient engagement: Engaged    Measurement of understanding: Patient verbalized understanding    Resources Provided: 3 Macronutrients, weight management packet, Mediterranean diet, high fiber snacks     Goal (s)      Goal 1: balanced meals      Goal 2: more mindful eating        Plan of Care     PES Statement:   Overweight / Obesity related to diet and lifestye as evidenced by BMI.     Follow Up Visit      Follow Up:   Will call to schedule     Total of 60 minutes spent with patient on nutrition counseling. Education based on Academy of Nutrition and Dietetics guidelines. Patient was provided with RD's contact information. Thank you for this referral.

## 2024-09-23 ENCOUNTER — OFFICE VISIT (OUTPATIENT)
Dept: FAMILY MEDICINE CLINIC | Facility: CLINIC | Age: 64
End: 2024-09-23
Payer: COMMERCIAL

## 2024-09-23 VITALS
HEART RATE: 76 BPM | WEIGHT: 244 LBS | DIASTOLIC BLOOD PRESSURE: 80 MMHG | HEIGHT: 73 IN | OXYGEN SATURATION: 98 % | BODY MASS INDEX: 32.34 KG/M2 | SYSTOLIC BLOOD PRESSURE: 110 MMHG

## 2024-09-23 DIAGNOSIS — R79.89 LOW TESTOSTERONE: ICD-10-CM

## 2024-09-23 DIAGNOSIS — Z51.81 THERAPEUTIC DRUG MONITORING: ICD-10-CM

## 2024-09-23 DIAGNOSIS — R73.03 PREDIABETES: ICD-10-CM

## 2024-09-23 DIAGNOSIS — E66.09 CLASS 1 OBESITY DUE TO EXCESS CALORIES WITH SERIOUS COMORBIDITY AND BODY MASS INDEX (BMI) OF 32.0 TO 32.9 IN ADULT: ICD-10-CM

## 2024-09-23 DIAGNOSIS — E29.1 HYPOGONADISM IN MALE: Primary | ICD-10-CM

## 2024-09-23 PROCEDURE — 99214 OFFICE O/P EST MOD 30 MIN: CPT | Performed by: FAMILY MEDICINE

## 2024-09-23 RX ORDER — TESTOSTERONE 20.25 MG/1.25G
20.25 GEL TOPICAL EVERY MORNING
Qty: 75 G | Refills: 1 | Status: SHIPPED | OUTPATIENT
Start: 2024-09-23

## 2024-09-24 ENCOUNTER — PRIOR AUTHORIZATION (OUTPATIENT)
Dept: FAMILY MEDICINE CLINIC | Facility: CLINIC | Age: 64
End: 2024-09-24
Payer: COMMERCIAL

## 2024-09-26 ENCOUNTER — TELEPHONE (OUTPATIENT)
Dept: FAMILY MEDICINE CLINIC | Facility: CLINIC | Age: 64
End: 2024-09-26
Payer: COMMERCIAL

## 2024-09-26 NOTE — TELEPHONE ENCOUNTER
Caller: Manuel Pineda    Relationship: Self    Best call back number: [phone]    What is the best time to reach you: AS SOON AS    Who are you requesting to speak with (clinical staff, provider,  specific staff member): CLINICAL STAFF    What was the call regarding: PATIENT WOULD LIKE AN UPDATE AS TO THE PRIOR AUTHORIZATION REQUEST AND PLAN OF CARE

## 2024-09-30 ENCOUNTER — TELEPHONE (OUTPATIENT)
Dept: FAMILY MEDICINE CLINIC | Facility: CLINIC | Age: 64
End: 2024-09-30
Payer: COMMERCIAL

## 2024-09-30 LAB — DRUGS UR: NORMAL

## 2024-09-30 NOTE — TELEPHONE ENCOUNTER
Rx Refill Note  Requested Prescriptions     Pending Prescriptions Disp Refills    rosuvastatin (CRESTOR) 10 MG tablet 90 tablet       Last office visit with prescribing clinician: 9/23/2024   Last telemedicine visit with prescribing clinician: Visit date not found   Next office visit with prescribing clinician: 12/20/2024                         Would you like a call back once the refill request has been completed: [] Yes [] No    If the office needs to give you a call back, can they leave a voicemail: [] Yes [] No    Patricia Oliver MA  09/30/24, 11:10 EDT

## 2024-09-30 NOTE — TELEPHONE ENCOUNTER
Caller: Manuel Pineda    Relationship: Self    Best call back number: [phone]     What test/procedure requested: TESTERONE MEDICATION  NEEDS A PRIOR AUTHORIZATION     When is it needed: ASAP    Where is the test/procedure going to be performed: NA    Additional information or concerns: PA NEEDED TO BE SUBMITTED AGAIN  FOR MEDICATION TO BE APPROVED.

## 2024-09-30 NOTE — TELEPHONE ENCOUNTER
Caller: Manuel Pineda    Relationship: Self    Best call back number: [phone]     Caller requesting test results: YES    What test was performed: 09/23/2024    When was the test performed: URINALYSIS     Where was the test performed: 09/23/2024    Additional notes: PATIENT WOULD LIKE A CALL BACK WITH RESULTS

## 2024-10-01 RX ORDER — ROSUVASTATIN CALCIUM 10 MG/1
10 TABLET, COATED ORAL DAILY
Qty: 90 TABLET | Refills: 2 | Status: SHIPPED | OUTPATIENT
Start: 2024-10-01

## 2024-10-02 ENCOUNTER — OFFICE VISIT (OUTPATIENT)
Dept: PAIN MEDICINE | Facility: CLINIC | Age: 64
End: 2024-10-02
Payer: COMMERCIAL

## 2024-10-02 VITALS — BODY MASS INDEX: 32.34 KG/M2 | HEIGHT: 73 IN | WEIGHT: 244 LBS

## 2024-10-02 DIAGNOSIS — M47.812 CERVICAL SPONDYLOSIS WITHOUT MYELOPATHY: ICD-10-CM

## 2024-10-02 DIAGNOSIS — M54.12 CERVICAL RADICULOPATHY: Primary | ICD-10-CM

## 2024-10-02 PROCEDURE — 99204 OFFICE O/P NEW MOD 45 MIN: CPT | Performed by: STUDENT IN AN ORGANIZED HEALTH CARE EDUCATION/TRAINING PROGRAM

## 2024-10-02 NOTE — PROGRESS NOTES
Referring Physician: Migel Vargas MD  [address]  [address]  [place] [id number]    Primary Physician: Dallin Loza DO    CHIEF COMPLAINT or REASON FOR VISIT: Neck Pain (New patient)      Initial history of present illness on 10/02/2024:  Mr. Manuel Pineda is 64 y.o. male who presents as a new patient referral for evaluation and treatment of chronic right-sided neck pain with radiation of the right shoulder.  Pain has been present for several years without any specific inciting event or trauma.  He describes right-sided axial neck pain which does radiate into the occiput and into the right periscapular area.  He has previously trialed physical therapy, chiropractic, massage therapy, dry needling.  He has been evaluated by orthopedics, Dr. Vargas, for his right shoulder and was diagnosed with intrinsic right shoulder problems/impingement and probable cervical involvement.  He was referred for evaluation of cervical radiculopathy/spondylosis.  He reports infrequent radiation into his hand.  Patient denies any bowel or bladder dysfunction, lower extremity weakness, new onset saddle anesthesia or unexplained weight loss.  He did have benefit from Dr. Vargas with a right subacromial subdeltoid steroid injection for his right shoulder.  He takes venlafaxine.  Some benefit with anti-inflammatories.    Interval history:    Interventions:      Objective Pain Scoring:   BRIEF PAIN INVENTORY:  Total score:   Pain Score    10/02/24 1408   PainSc:   2   PainLoc: Neck      PHQ-2: PHQ-2 Total Score: 1  PHQ-9: PHQ-9: Brief Depression Severity Measure Score: 1  Opioid Risk Tool:         Review of Systems:   ROS negative except as otherwise noted     Past Medical History:   Past Medical History:   Diagnosis Date    Anxiety     Arthritis of back 2015    Cervical disc disorder 2022    Degenerative arthritis of left knee     Injection at ortho years ago    GERD (gastroesophageal reflux disease)     HLD  "(hyperlipidemia)     Hypertension     Lumbosacral disc disease          Past Surgical History:   Past Surgical History:   Procedure Laterality Date    APPENDECTOMY      TONSILLECTOMY           Family History   Family History   Problem Relation Age of Onset    Multiple myeloma Mother     Cancer Mother         multiple myeloma    Hypertension Father     Skin cancer Father     Stroke Maternal Grandfather          Social History   Social History     Socioeconomic History    Marital status: Single   Tobacco Use    Smoking status: Former     Current packs/day: 0.00     Average packs/day: 1 pack/day for 22.0 years (22.0 ttl pk-yrs)     Types: Cigarettes     Start date: 1978     Quit date:      Years since quittin.7     Passive exposure: Never    Smokeless tobacco: Never   Substance and Sexual Activity    Alcohol use: Yes     Alcohol/week: 5.0 standard drinks of alcohol     Types: 5 Drinks containing 0.5 oz of alcohol per week     Comment: Moderate    Drug use: No    Sexual activity: Defer        Medications:     Current Outpatient Medications:     ALPRAZolam (XANAX) 0.5 MG tablet, Take 1 tablet by mouth 2 (Two) Times a Day As Needed for Anxiety., Disp: , Rfl:     dicyclomine (BENTYL) 20 MG tablet, Take 1 tablet by mouth Every 6 (Six) Hours., Disp: , Rfl:     lisinopril (PRINIVIL,ZESTRIL) 10 MG tablet, Take 1 tablet by mouth Daily., Disp: , Rfl:     pantoprazole (PROTONIX) 40 MG EC tablet, Take 1 tablet by mouth Daily., Disp: , Rfl:     rosuvastatin (CRESTOR) 10 MG tablet, Take 1 tablet by mouth Daily., Disp: 90 tablet, Rfl: 2    Testosterone 20.25 MG/1.25GM (1.62%) gel, Place 1 Application on the skin as directed by provider Every Morning. Rotate sites. Have levels rechecked in 3 months, Disp: 75 g, Rfl: 1    venlafaxine XR (EFFEXOR-XR) 75 MG 24 hr capsule, 1 capsule., Disp: , Rfl:         Physical Exam:     Vitals:    10/02/24 1408   Weight: 111 kg (244 lb)   Height: 185.4 cm (72.99\")   PainSc:   2 "   PainLoc: Neck        General: Alert and oriented, No acute distress.   HEENT: Normocephalic, atraumatic.   Cardiovascular: No gross edema  Respiratory: Respirations are non-labored    Cervical Spine:   No masses or atrophy  Range of motion - Flexion normal. Extension normal. Lateral rotation normal.   Palpation -tender right  Spurling's - negative     Thoracic Spine:   Inspection: no gross abnormality  Paraspinal muscle palpation: Tender right periscapular  Spinous process palpation: nontender       Motor Exam:    Strength: Rate on 1-5 scale Right Left    C5-Elbow flexion, Deltoid 5/5  5/5    C6-Wrist extension 5/5  5/5    C7- Elbow / finger extension 5/5  5/5    C8- Finger flexion 5/5  5/5    T1- [employer] hand 5/5  5/5    Strength: Rate on 1-5 scale Right Left    L1/2- hip flexion 5/5  5/5    L3- knee extension 5/5  5/5    L4- ankle dorsiflexion 5/5  5/5    L5- great toe extension 5/5  5/5    S1- ankle plantarflexion 5/5  5/5    Sensory Exam: Full and equal sensation to light touch throughout.       Neurologic: Cranial Nerves II-XII are grossly intact.      Psychiatric: Cooperative.   Gait: Normal   Assistive Devices: None    Imaging Studies:   No results found for this or any previous visit.        Independent review of radiographic imaging: No images available today    Impression & Plan:       10/02/2024: Manuel Pineda is a 64 y.o. male with past medical history significant for prediabetes, anxiety, GERD, HLD, HTN who presents to the pain clinic for evaluation and treatment of chronic right-sided neck pain with radiation of the right occiput and right shoulder.  Evaluation consistent with cervical radiculopathy, cervical spondylosis, right shoulder impingement.   We discussed epidural steroid injection to improve pain.  If greater than 50% relief for at least 2-3 months can consider repeat as needed every 3 to 4 months.  I had a discussion with the patient regarding the risks of the procedure  including bleeding, infection, damage to surrounding structures.  We discussed the potential adverse effects of corticosteroid injection including flushing of the face, lipodystrophy, skin discoloration, elevated blood glucose, increased blood pressure.  Risks of frequent steroid administration include weight gain, hormonal changes, mood changes, osteoporosis.  Can consider right C MBB/RFA.    1. Cervical radiculopathy    2. Cervical spondylosis without myelopathy        PLAN:  1. Medication Recommendations: Recommend Voltaren topical, NSAIDs, Tylenol.  Can trial turmeric 500 mg twice daily if NSAID contraindicated.    2. Physical Therapy: Continue HEP    3. Psychological: defer    4. Complementary and alternative (CAM) Therapies:     5. Labs/Diagnostic studies: None indicated     6. Imaging: MRI report reviewed    7. Interventions: Schedule right-paramedian cervical interlaminar epidural steroid injection ([id number]).  Consider C MBB    8. Referrals: None indicated     9. Records: Ortho notes reviewed    10. Lifestyle goals:    Follow-up 1 month      [facility]  John Rojas MD          Quality Metrics:

## 2024-10-08 NOTE — TELEPHONE ENCOUNTER
Rx Refill Note  Requested Prescriptions     Pending Prescriptions Disp Refills    venlafaxine XR (EFFEXOR-XR) 75 MG 24 hr capsule       Si capsule.      Last office visit with prescribing clinician: 2024   Last telemedicine visit with prescribing clinician: Visit date not found   Next office visit with prescribing clinician: 2024                         Would you like a call back once the refill request has been completed: [] Yes [] No    If the office needs to give you a call back, can they leave a voicemail: [] Yes [] No    Irene Jordan MA  10/08/24, 12:34 EDT

## 2024-10-09 RX ORDER — VENLAFAXINE HYDROCHLORIDE 75 MG/1
75 CAPSULE, EXTENDED RELEASE ORAL DAILY
Qty: 90 CAPSULE | Refills: 1 | Status: SHIPPED | OUTPATIENT
Start: 2024-10-09

## 2024-10-10 ENCOUNTER — DOCUMENTATION (OUTPATIENT)
Dept: PAIN MEDICINE | Facility: CLINIC | Age: 64
End: 2024-10-10

## 2024-10-10 ENCOUNTER — OUTSIDE FACILITY SERVICE (OUTPATIENT)
Dept: PAIN MEDICINE | Facility: CLINIC | Age: 64
End: 2024-10-10
Payer: COMMERCIAL

## 2024-10-10 NOTE — PROGRESS NOTES
[facility]  [address] [place] [address]      PROCEDURE: Fluoroscopically-guided Cervical Interlaminar Epidural Steroid Injection    PRE-OP DIAGNOSIS: Cervical radiculopathy  POST-OP DIAGNOSIS: Cervical radiculopathy    BLOOD THINNERS (ANTIPLATELETS/ANTICOAGULANTS): Were discussed with the patient and have been managed according to [facility] Guidelines.    CONSENT: Risks, benefits and options were explained to the patient, all questions were answered and written informed consent was obtained.     ANESTHESIA: Local only       PROCEDURE NOTE: A pre-procedural time out was performed to confirm the correct patient, procedure, side, and site. The patient was placed prone with pillow under the chest and all pressure points padded. The patient's neck and upper thoracic spine were prepped in standard fashion using Chlorhexidine and draped with sterile towels. The target cervical interspace was identified using fluoroscopy. The overlying skin and subcutaneous tissue was anesthetized with 1% lidocaine. A 20 gauge 10 cm Tuohy needle was advanced using intermittent AP and contralateral oblique fluoroscopy to the targeted level. The ligamentum flavum was engaged. Access to the epidural space was gained using a loss of resistance technique to combination air and normal saline. Needle placement was confirmed with 1 ml of Omnipaque 180 mgI/ml contrast using biplanar fluoroscopic imaging. An epidurogram was noted without evidence of intravascular or intrathecal spread. After negative aspiration, a mixture containing 2 ml of preservative-free normal saline, Dexamethasone 10 mg steroid for a total volume of 3 ml was injected under direct visualization with fluoroscopy. The Tuohy needle was flushed, removed and a bandage applied.     EBL: None     COMPLICATIONS: None     The patient tolerated the procedure well. Vital signs were stable. Sensory and motor exam was unchanged from baseline. The  patient was observed in recovery and was discharged after meeting established criteria.    FOLLOW UP: As scheduled     ADDITIONAL NOTES: []    CODES:  [id number]

## 2024-11-07 ENCOUNTER — OFFICE VISIT (OUTPATIENT)
Dept: PAIN MEDICINE | Facility: CLINIC | Age: 64
End: 2024-11-07
Payer: COMMERCIAL

## 2024-11-07 VITALS — BODY MASS INDEX: 33.04 KG/M2 | HEIGHT: 73 IN | WEIGHT: 249.3 LBS

## 2024-11-07 DIAGNOSIS — M54.12 CERVICAL RADICULOPATHY: Primary | ICD-10-CM

## 2024-11-07 DIAGNOSIS — M47.812 CERVICAL SPONDYLOSIS WITHOUT MYELOPATHY: ICD-10-CM

## 2024-11-07 NOTE — PROGRESS NOTES
Referring Physician: No referring provider defined for this encounter.    Primary Physician: Dallin Loza DO    CHIEF COMPLAINT or REASON FOR VISIT: Follow-up (Post COREEN/Patient reports about 60-75% relief after injection. ) and Neck Pain      Initial history of present illness on 10/02/2024:  Mr. Manuel Pineda is 64 y.o. male who presents as a new patient referral for evaluation and treatment of chronic right-sided neck pain with radiation of the right shoulder.  Pain has been present for several years without any specific inciting event or trauma.  He describes right-sided axial neck pain which does radiate into the occiput and into the right periscapular area.  He has previously trialed physical therapy, chiropractic, massage therapy, dry needling.  He has been evaluated by orthopedics, Dr. Vargas, for his right shoulder and was diagnosed with intrinsic right shoulder problems/impingement and probable cervical involvement.  He was referred for evaluation of cervical radiculopathy/spondylosis.  He reports infrequent radiation into his hand.  Patient denies any bowel or bladder dysfunction, lower extremity weakness, new onset saddle anesthesia or unexplained weight loss.  He did have benefit from Dr. Vargas with a right subacromial subdeltoid steroid injection for his right shoulder.  He takes venlafaxine.  Some benefit with anti-inflammatories.    Interval history:  Patient returns to clinic today after undergoing a cervical epidural steroid injection.  He reports good relief of his neck pain from this procedure.  He would be interested in repeat injections if his neck pain returns.  Today he primary complains of chronic right shoulder pain which she follows with orthopedics, Dr. Vargas, for this issue.  Additionally, he complains of a headache located at the superior portion of his occipital bone.  He typically will have pain radiating from his neck up into the top of his head however he is not  having the neck pain as of this moment.  He continues to have some pain in this region.    Interventions:  10/10/2024: COREEN with 75% relief ongoing    Objective Pain Scoring:   BRIEF PAIN INVENTORY:  Total score:   Pain Score    24 1335   PainSc:   1   PainLoc: Neck        PHQ-2:    PHQ-9:    Opioid Risk Tool:         Review of Systems:   ROS negative except as otherwise noted     Past Medical History:   Past Medical History:   Diagnosis Date    Anxiety     Arthritis of back     Cervical disc disorder     Degenerative arthritis of left knee     Injection at ortho years ago    GERD (gastroesophageal reflux disease)     HLD (hyperlipidemia)     Hypertension     Lumbosacral disc disease          Past Surgical History:   Past Surgical History:   Procedure Laterality Date    APPENDECTOMY      TONSILLECTOMY           Family History   Family History   Problem Relation Age of Onset    Multiple myeloma Mother     Cancer Mother         multiple myeloma    Hypertension Father     Skin cancer Father     Stroke Maternal Grandfather          Social History   Social History     Socioeconomic History    Marital status: Single   Tobacco Use    Smoking status: Former     Current packs/day: 0.00     Average packs/day: 1 pack/day for 22.0 years (22.0 ttl pk-yrs)     Types: Cigarettes     Start date: 1978     Quit date:      Years since quittin.8     Passive exposure: Never    Smokeless tobacco: Never   Substance and Sexual Activity    Alcohol use: Yes     Alcohol/week: 5.0 standard drinks of alcohol     Types: 5 Drinks containing 0.5 oz of alcohol per week     Comment: Moderate    Drug use: No    Sexual activity: Defer        Medications:     Current Outpatient Medications:     ALPRAZolam (XANAX) 0.5 MG tablet, Take 1 tablet by mouth 2 (Two) Times a Day As Needed for Anxiety., Disp: , Rfl:     dicyclomine (BENTYL) 20 MG tablet, Take 1 tablet by mouth Every 6 (Six) Hours., Disp: , Rfl:     lisinopril  "(PRINIVIL,ZESTRIL) 10 MG tablet, Take 1 tablet by mouth Daily., Disp: , Rfl:     pantoprazole (PROTONIX) 40 MG EC tablet, Take 1 tablet by mouth Daily., Disp: , Rfl:     rosuvastatin (CRESTOR) 10 MG tablet, Take 1 tablet by mouth Daily., Disp: 90 tablet, Rfl: 2    Testosterone 20.25 MG/1.25GM (1.62%) gel, Place 1 Application on the skin as directed by provider Every Morning. Rotate sites. Have levels rechecked in 3 months, Disp: 75 g, Rfl: 1    venlafaxine XR (EFFEXOR-XR) 75 MG 24 hr capsule, Take 1 capsule by mouth Daily., Disp: 90 capsule, Rfl: 1        Physical Exam:     Vitals:    11/07/24 1335   Weight: 113 kg (249 lb 4.8 oz)   Height: 185.4 cm (72.99\")   PainSc:   1   PainLoc: Neck          General: Alert and oriented, No acute distress.   HEENT: Normocephalic, atraumatic.   Cardiovascular: No gross edema  Respiratory: Respirations are non-labored    Cervical Spine:   No masses or atrophy  Range of motion - Flexion normal. Extension normal. Lateral rotation normal.   Palpation -tender right  Spurling's - negative   Negative occipital Tinel's sign    Thoracic Spine:   Inspection: no gross abnormality  Paraspinal muscle palpation: Tender right periscapular  Spinous process palpation: nontender       Motor Exam:    Strength: Rate on 1-5 scale Right Left    C5-Elbow flexion, Deltoid 5/5  5/5    C6-Wrist extension 5/5  5/5    C7- Elbow / finger extension 5/5  5/5    C8- Finger flexion 5/5  5/5    T1- Intrinsics hand 5/5  5/5    Strength: Rate on 1-5 scale Right Left    L1/2- hip flexion 5/5  5/5    L3- knee extension 5/5  5/5    L4- ankle dorsiflexion 5/5  5/5    L5- great toe extension 5/5  5/5    S1- ankle plantarflexion 5/5  5/5    Sensory Exam: Full and equal sensation to light touch throughout.       Neurologic: Cranial Nerves II-XII are grossly intact.      Psychiatric: Cooperative.   Gait: Normal   Assistive Devices: None    Imaging Studies:   No results found for this or any previous " visit.        Independent review of radiographic imaging: No images available today    Impression & Plan:       10/02/2024: Manuel Pineda is a 64 y.o. male with past medical history significant for prediabetes, anxiety, GERD, HLD, HTN who presents to the pain clinic for evaluation and treatment of chronic right-sided neck pain with radiation of the right occiput and right shoulder.  Evaluation consistent with cervical radiculopathy, cervical spondylosis, right shoulder impingement.   We discussed epidural steroid injection to improve pain.  If greater than 50% relief for at least 2-3 months can consider repeat as needed every 3 to 4 months.  I had a discussion with the patient regarding the risks of the procedure including bleeding, infection, damage to surrounding structures.  We discussed the potential adverse effects of corticosteroid injection including flushing of the face, lipodystrophy, skin discoloration, elevated blood glucose, increased blood pressure.  Risks of frequent steroid administration include weight gain, hormonal changes, mood changes, osteoporosis.  Can consider right CMBB/RFA.  10/7/2024: Good relief from COREEN.  Can consider repeat if needed.  Can consider right CMBB/RFA.  Following with orthopedics regarding right shoulder pain.    1. Cervical radiculopathy    2. Cervical spondylosis without myelopathy          PLAN:  1. Medication Recommendations: Recommend Voltaren topical, NSAIDs, Tylenol.  Can trial turmeric 500 mg twice daily if NSAID contraindicated.    2. Physical Therapy: Continue HEP    3. Psychological: defer    4. Complementary and alternative (CAM) Therapies:     5. Labs/Diagnostic studies: None indicated     6. Imaging: MRI report reviewed    7. Interventions: Can consider repeat COREEN.  Consider right-sided C MBB.  Could even contemplate right occipital nerve block    8. Referrals: None indicated     9. Records:     10. Lifestyle goals:    Follow-up 3 to 4 months      [employer]  [facility]  Michele Anne PA-C          Quality Metrics:

## 2024-12-20 ENCOUNTER — OFFICE VISIT (OUTPATIENT)
Dept: FAMILY MEDICINE CLINIC | Facility: CLINIC | Age: 64
End: 2024-12-20
Payer: COMMERCIAL

## 2024-12-20 VITALS
HEIGHT: 73 IN | WEIGHT: 249.4 LBS | SYSTOLIC BLOOD PRESSURE: 128 MMHG | BODY MASS INDEX: 33.05 KG/M2 | OXYGEN SATURATION: 97 % | HEART RATE: 97 BPM | DIASTOLIC BLOOD PRESSURE: 86 MMHG

## 2024-12-20 DIAGNOSIS — E29.1 HYPOGONADISM IN MALE: Primary | ICD-10-CM

## 2024-12-20 PROCEDURE — 99214 OFFICE O/P EST MOD 30 MIN: CPT | Performed by: FAMILY MEDICINE

## 2024-12-20 RX ORDER — TESTOSTERONE 20.25 MG/1.25G
20.25 GEL TOPICAL EVERY MORNING
Qty: 112.5 G | Refills: 0 | Status: SHIPPED | OUTPATIENT
Start: 2024-12-20

## 2024-12-20 NOTE — ASSESSMENT & PLAN NOTE
Lab Results   Component Value Date    TESTOSTERONE 118.00 (L) 07/24/2024   Continue testosterone topical gel 1 pump daily, repeat levels needed but symptomatically improved    Lab Results   Component Value Date    PSA 6.510 (H) 08/26/2024    PSA 7.980 (H) 07/24/2024   Historically elevated PSA, actually downtrending in August, monitor but has previously been worked up with urologist

## 2024-12-20 NOTE — PROGRESS NOTES
Established Patient Office Visit      Patient Name: Manuel Pineda  : 1960   MRN: [id number]   Care Team: Patient Care Team:  Dallin Loza DO as PCP - General (Family Medicine)  Don Lucio MD (Urology)  Fred Villanueva MD as Consulting Physician (Gastroenterology)  Migel Vargas MD as Surgeon (Orthopedic Surgery)    Chief Complaint:    Chief Complaint   Patient presents with    Testosterone     Follow up     Weight Loss     Follow up        History of Present Illness: Manuel Pineda is a 64 y.o. male who is here today for chief complaint.    HPI    Has been out of his testosterone for about 5 days. Before that he was feeling great since starting, energy level is up, feels like he is making strength gains lifting weights. No side effects.    This patient is accompanied by their self who contributes to the history of their care.    The following portions of the patient's history were reviewed and updated as appropriate: allergies, current medications, past family history, past medical history, past social history, past surgical history and problem list.    Subjective      Review of Systems:   Review of Systems - See HPI    Past Medical History:   Past Medical History:   Diagnosis Date    Anxiety     Arthritis of back 2015    Cervical disc disorder     Degenerative arthritis of left knee     Injection at ortho years ago    GERD (gastroesophageal reflux disease)     HLD (hyperlipidemia)     Hypertension     Lumbosacral disc disease        Past Surgical History:   Past Surgical History:   Procedure Laterality Date    APPENDECTOMY      TONSILLECTOMY         Family History:   Family History   Problem Relation Age of Onset    Multiple myeloma Mother     Cancer Mother         multiple myeloma    Hypertension Father     Skin cancer Father     Stroke Maternal Grandfather        Social History:   Social History     Socioeconomic History    Marital status: Single   Tobacco Use     Smoking status: Former     Current packs/day: 0.00     Average packs/day: 1 pack/day for 22.0 years (22.0 ttl pk-yrs)     Types: Cigarettes     Start date: 1978     Quit date:      Years since quittin.9     Passive exposure: Never    Smokeless tobacco: Never   Vaping Use    Vaping status: Never Used   Substance and Sexual Activity    Alcohol use: Yes     Alcohol/week: 5.0 standard drinks of alcohol     Types: 5 Drinks containing 0.5 oz of alcohol per week     Comment: Moderate    Drug use: No    Sexual activity: Defer       Tobacco History:   Social History     Tobacco Use   Smoking Status Former    Current packs/day: 0.00    Average packs/day: 1 pack/day for 22.0 years (22.0 ttl pk-yrs)    Types: Cigarettes    Start date: 1978    Quit date:     Years since quittin.9    Passive exposure: Never   Smokeless Tobacco Never       Medications:   Outpatient Medications Prior to Visit   Medication Sig Dispense Refill    ALPRAZolam (XANAX) 0.5 MG tablet Take 1 tablet by mouth 2 (Two) Times a Day As Needed for Anxiety.      dicyclomine (BENTYL) 20 MG tablet Take 1 tablet by mouth Every 6 (Six) Hours.      lisinopril (PRINIVIL,ZESTRIL) 10 MG tablet Take 1 tablet by mouth Daily.      pantoprazole (PROTONIX) 40 MG EC tablet Take 1 tablet by mouth Daily.      rosuvastatin (CRESTOR) 10 MG tablet Take 1 tablet by mouth Daily. 90 tablet 2    venlafaxine XR (EFFEXOR-XR) 75 MG 24 hr capsule Take 1 capsule by mouth Daily. 90 capsule 1    Testosterone 20.25 MG/1.25GM (1.62%) gel Place 1 Application on the skin as directed by provider Every Morning. Rotate sites. Have levels rechecked in 3 months 75 g 1     No facility-administered medications prior to visit.        Allergies:   Allergies   Allergen Reactions    Hydrocodone      Itching       Objective   Objective     Physical Exam:  Vital Signs:   Vitals:    24 1106   BP: 128/86   Pulse: 97   SpO2: 97%   Weight: 113 kg (249 lb 6.4 oz)   Height: 185.4 cm  "(72.99\")     Body mass index is 32.91 kg/m².     Physical Exam  Nursing note reviewed  Const: NAD, A&Ox4, Pleasant, Cooperative  Eyes: EOMI, no conjunctivitis  ENT: No nasal discharge present, neck supple  Cardiac: Regular rate and rhythm, no cyanosis  Resp: Respiratory rate within normal limits, no increased work of breathing, no audible wheezing or retractions noted  GI: No distention or ascites  MSK: Motor and sensation grossly intact in bilateral upper extremities  Neurologic: CN II-XII grossly intact  Psych: Appropriate mood and behavior.  Skin: Warm, dry  Procedures/Radiology     Procedures  No radiology results for the last 7 days     Assessment & Plan   Assessment / Plan      Assessment/Plan:   Problems Addressed This Visit  Diagnoses and all orders for this visit:    1. Hypogonadism in male (Primary)  Assessment & Plan:  Lab Results   Component Value Date    TESTOSTERONE 118.00 (L) 07/24/2024   Continue testosterone topical gel 1 pump daily, repeat levels needed but symptomatically improved    Lab Results   Component Value Date    PSA 6.510 (H) 08/26/2024    PSA 7.980 (H) 07/24/2024   Historically elevated PSA, actually downtrending in August, monitor but has previously been worked up with urologist    Orders:  -     Testosterone 20.25 MG/1.25GM (1.62%) gel; Place 1 Application on the skin as directed by provider Every Morning. Rotate sites. Have levels rechecked in 3 months  Dispense: 112.5 g; Refill: 0      Problem List Items Addressed This Visit          Endocrine and Metabolic    Hypogonadism in male - Primary    Overview     Historically elevated PSA, but normal biopsy with Dr. Lucio 3 years ago         Current Assessment & Plan     Lab Results   Component Value Date    TESTOSTERONE 118.00 (L) 07/24/2024   Continue testosterone topical gel 1 pump daily, repeat levels needed but symptomatically improved    Lab Results   Component Value Date    PSA 6.510 (H) 08/26/2024    PSA 7.980 (H) 07/24/2024 "   Historically elevated PSA, actually downtrending in August, monitor but has previously been worked up with urologist         Relevant Medications    Testosterone 20.25 MG/1.25GM (1.62%) gel     No orders of the defined types were placed in this encounter.      Continue testosterone dosing, symptomatically very much improved    Patient Instructions   Wait about 2 weeks then have labs drawn  Have them redone again before next visit    Follow Up:   Return in about 4 months (around 4/20/2025) for testosterone, (fasting blood work 1 week prior to appt).      DO KACI Billings RD  [facility]  [address]  [facility] [address]  Fax [phone]  Phone [phone]    Disclaimer to patients: The 21st Century Cares Act makes medical notes like these available to patients in the interest of transparency. However, please be advised that this is still a medical document. It is intended as xjko-pq-tawk communication. Many sections may include medical language or jargon, abbreviations, and additional verbiage that are unfamiliar or confusing. In some ways it may come across as blunt, direct, or may be summarized in order to clearly and concisely communicate the most crucial information to medical professionals. It may also include mentions of conditions that are unlikely but considered as part of the differential diagnosis, including serious disorders. These are not always discussed at length at the time of appointment because their likelihood is so low, but may be included in a medical note to make it clear what has been considered and/or ruled out as part of a work-up. Medical documents are intended to carry relevant information, facts as evident, and the personal clinical opinion of the physician. If you have any questions regarding this medical document, please bring them to the attention of the physician at your next scheduled appointment.

## 2025-01-02 ENCOUNTER — PRIOR AUTHORIZATION (OUTPATIENT)
Dept: FAMILY MEDICINE CLINIC | Facility: CLINIC | Age: 65
End: 2025-01-02
Payer: COMMERCIAL

## 2025-01-02 NOTE — TELEPHONE ENCOUNTER
Key: [id number]) - [id number]  Testosterone 20.25 MG/1.25GM(1.62%) gel  status: Sent to Plan today  Created: January 2nd, 2025      The request was denied because:  We have denied your request because your plan does not cover this drug for age-related  hypogonadism, also referred to as late-onset hypogonadism. We reviewed the information  we had. Your request has been denied. Your doctor can send us any new or missing  information for us to review. For this drug, you may have to meet other criteria. You can  request the drug policy for more details. You can also request other plan documents for  your review.  Prescription Claim Appeals  [facility]  [address] [address]  [id number] TDD/TTY: [phone]  [place] [address]  Fax: [phone]

## 2025-01-02 NOTE — TELEPHONE ENCOUNTER
Please let patient know.  We can still write the prescription, but he may have to pay out-of-pocket.

## 2025-01-13 RX ORDER — LISINOPRIL 10 MG/1
10 TABLET ORAL DAILY
Qty: 90 TABLET | Refills: 0 | Status: SHIPPED | OUTPATIENT
Start: 2025-01-13

## 2025-01-13 NOTE — TELEPHONE ENCOUNTER
Rx Refill Note  Requested Prescriptions     Pending Prescriptions Disp Refills    lisinopril (PRINIVIL,ZESTRIL) 10 MG tablet       Sig: Take 1 tablet by mouth Daily.      Last office visit with prescribing clinician: 12/20/2024   Last telemedicine visit with prescribing clinician: Visit date not found   Next office visit with prescribing clinician: 4/21/2025                         Would you like a call back once the refill request has been completed: [] Yes [] No    If the office needs to give you a call back, can they leave a voicemail: [] Yes [] No    Irene Jordan MA  01/13/25, 10:32 EST

## 2025-01-27 ENCOUNTER — LAB (OUTPATIENT)
Dept: LAB | Facility: HOSPITAL | Age: 65
End: 2025-01-27
Payer: COMMERCIAL

## 2025-01-27 DIAGNOSIS — E29.1 HYPOGONADISM IN MALE: ICD-10-CM

## 2025-01-27 DIAGNOSIS — R79.89 LOW TESTOSTERONE: ICD-10-CM

## 2025-01-27 LAB
HCT VFR BLD AUTO: 43.1 % (ref 37.5–51)
HGB BLD-MCNC: 13.7 G/DL (ref 13–17.7)
PROLACTIN SERPL-MCNC: 8.82 NG/ML (ref 4.04–15.2)
PSA SERPL-MCNC: 8.02 NG/ML (ref 0–4)

## 2025-01-27 PROCEDURE — 84403 ASSAY OF TOTAL TESTOSTERONE: CPT

## 2025-01-27 PROCEDURE — 84402 ASSAY OF FREE TESTOSTERONE: CPT

## 2025-01-27 PROCEDURE — 84146 ASSAY OF PROLACTIN: CPT

## 2025-01-27 PROCEDURE — 84153 ASSAY OF PSA TOTAL: CPT

## 2025-01-27 PROCEDURE — 85014 HEMATOCRIT: CPT

## 2025-01-27 PROCEDURE — 85018 HEMOGLOBIN: CPT

## 2025-01-30 LAB
TESTOST FREE SERPL-MCNC: 2.5 PG/ML (ref 6.6–18.1)
TESTOST SERPL-MCNC: 237.7 NG/DL (ref 264–916)

## 2025-02-03 RX ORDER — PANTOPRAZOLE SODIUM 40 MG/1
40 TABLET, DELAYED RELEASE ORAL DAILY
Qty: 90 TABLET | Refills: 0 | Status: SHIPPED | OUTPATIENT
Start: 2025-02-03

## 2025-02-03 NOTE — TELEPHONE ENCOUNTER
Rx Refill Note  Requested Prescriptions     Pending Prescriptions Disp Refills    pantoprazole (PROTONIX) 40 MG EC tablet       Sig: Take 1 tablet by mouth Daily.      Last office visit with prescribing clinician: 12/20/2024   Last telemedicine visit with prescribing clinician: Visit date not found   Next office visit with prescribing clinician: 4/21/2025                         Would you like a call back once the refill request has been completed: [] Yes [] No    If the office needs to give you a call back, can they leave a voicemail: [] Yes [] No    Irene Jordan MA  02/03/25, 10:00 EST

## 2025-03-04 DIAGNOSIS — E29.1 HYPOGONADISM IN MALE: ICD-10-CM

## 2025-03-04 RX ORDER — TESTOSTERONE 20.25 MG/1.25G
20.25 GEL TOPICAL EVERY MORNING
Qty: 112.5 G | Refills: 0 | Status: SHIPPED | OUTPATIENT
Start: 2025-03-04

## 2025-03-04 NOTE — TELEPHONE ENCOUNTER
Rx Refill Note  Requested Prescriptions     Pending Prescriptions Disp Refills    Testosterone 20.25 MG/1.25GM (1.62%) gel 112.5 g 0     Sig: Place 1 Application on the skin as directed by provider Every Morning. Rotate sites. Have levels rechecked in 3 months      Last office visit with prescribing clinician: 12/20/2024   Last telemedicine visit with prescribing clinician: Visit date not found   Next office visit with prescribing clinician: 4/21/2025                         Would you like a call back once the refill request has been completed: [] Yes [] No    If the office needs to give you a call back, can they leave a voicemail: [] Yes [] No    Irene Jordan MA  03/04/25, 12:22 EST

## 2025-04-07 RX ORDER — VENLAFAXINE HYDROCHLORIDE 75 MG/1
75 CAPSULE, EXTENDED RELEASE ORAL DAILY
Qty: 90 CAPSULE | Refills: 1 | Status: SHIPPED | OUTPATIENT
Start: 2025-04-07

## 2025-04-07 RX ORDER — LISINOPRIL 10 MG/1
10 TABLET ORAL DAILY
Qty: 90 TABLET | Refills: 0 | Status: SHIPPED | OUTPATIENT
Start: 2025-04-07

## 2025-04-07 NOTE — TELEPHONE ENCOUNTER
Rx Refill Note  Requested Prescriptions     Pending Prescriptions Disp Refills    venlafaxine XR (EFFEXOR-XR) 75 MG 24 hr capsule [Pharmacy Med Name: VENLAFAXINE HCL ER 75 MG CAP] 90 capsule 1     Sig: TAKE 1 CAPSULE BY MOUTH DAILY    lisinopril (PRINIVIL,ZESTRIL) 10 MG tablet [Pharmacy Med Name: LISINOPRIL 10 MG TABLET] 90 tablet 0     Sig: TAKE 1 TABLET BY MOUTH DAILY      Last office visit with prescribing clinician: 12/20/2024   Last telemedicine visit with prescribing clinician: Visit date not found   Next office visit with prescribing clinician: 4/21/2025       Bety Dietrich MA  04/07/25, 14:17 EDT

## 2025-05-02 RX ORDER — PANTOPRAZOLE SODIUM 40 MG/1
40 TABLET, DELAYED RELEASE ORAL DAILY
Qty: 90 TABLET | Refills: 0 | Status: SHIPPED | OUTPATIENT
Start: 2025-05-02

## 2025-05-02 NOTE — TELEPHONE ENCOUNTER
Rx Refill Note  Requested Prescriptions     Pending Prescriptions Disp Refills    pantoprazole (PROTONIX) 40 MG EC tablet [Pharmacy Med Name: PANTOPRAZOLE SOD DR 40 MG TAB] 90 tablet 0     Sig: TAKE 1 TABLET BY MOUTH DAILY      Last office visit with prescribing clinician: Visit date not found   Last telemedicine visit with prescribing clinician: Visit date not found   Next office visit with prescribing clinician: Visit date not found                         Would you like a call back once the refill request has been completed: [] Yes [] No    If the office needs to give you a call back, can they leave a voicemail: [] Yes [] No    Bety Dietrich MA  05/02/25, 08:36 EDT

## 2025-05-14 ENCOUNTER — LAB (OUTPATIENT)
Dept: LAB | Facility: HOSPITAL | Age: 65
End: 2025-05-14
Payer: COMMERCIAL

## 2025-05-14 DIAGNOSIS — E29.1 HYPOGONADISM IN MALE: ICD-10-CM

## 2025-05-14 DIAGNOSIS — R79.89 LOW TESTOSTERONE: ICD-10-CM

## 2025-05-14 LAB
HCT VFR BLD AUTO: 43.2 % (ref 37.5–51)
HGB BLD-MCNC: 14.3 G/DL (ref 13–17.7)
PSA SERPL-MCNC: 6.43 NG/ML (ref 0–4)

## 2025-05-14 PROCEDURE — 84402 ASSAY OF FREE TESTOSTERONE: CPT

## 2025-05-14 PROCEDURE — 84153 ASSAY OF PSA TOTAL: CPT

## 2025-05-14 PROCEDURE — 85014 HEMATOCRIT: CPT

## 2025-05-14 PROCEDURE — 85018 HEMOGLOBIN: CPT

## 2025-05-14 PROCEDURE — 84403 ASSAY OF TOTAL TESTOSTERONE: CPT

## 2025-05-16 ENCOUNTER — OFFICE VISIT (OUTPATIENT)
Dept: FAMILY MEDICINE CLINIC | Facility: CLINIC | Age: 65
End: 2025-05-16
Payer: COMMERCIAL

## 2025-05-16 ENCOUNTER — PRIOR AUTHORIZATION (OUTPATIENT)
Dept: FAMILY MEDICINE CLINIC | Facility: CLINIC | Age: 65
End: 2025-05-16

## 2025-05-16 VITALS
BODY MASS INDEX: 32.47 KG/M2 | WEIGHT: 245 LBS | HEART RATE: 88 BPM | HEIGHT: 73 IN | OXYGEN SATURATION: 95 % | SYSTOLIC BLOOD PRESSURE: 134 MMHG | DIASTOLIC BLOOD PRESSURE: 88 MMHG

## 2025-05-16 DIAGNOSIS — E29.1 HYPOGONADISM IN MALE: ICD-10-CM

## 2025-05-16 DIAGNOSIS — L74.512 HYPERHIDROSIS OF PALMS: Primary | ICD-10-CM

## 2025-05-16 PROCEDURE — 99214 OFFICE O/P EST MOD 30 MIN: CPT | Performed by: FAMILY MEDICINE

## 2025-05-16 RX ORDER — ALUMINUM CHLORIDE 20 %
SOLUTION, NON-ORAL TOPICAL
Qty: 60 ML | Refills: 2 | Status: SHIPPED | OUTPATIENT
Start: 2025-05-16

## 2025-05-16 RX ORDER — TESTOSTERONE 20.25 MG/1.25G
20.25 GEL TOPICAL EVERY MORNING
Qty: 112.5 G | Refills: 5 | Status: SHIPPED | OUTPATIENT
Start: 2025-05-16 | End: 2025-05-30

## 2025-05-16 NOTE — TELEPHONE ENCOUNTER
(Key: [id number]) - [id number]  Testosterone 1.62% gel  status: PA RequestCreated: May 16th, 2025 [phone]: May 16th, 2025    The request was denied because:  We have denied your request because your plan does not cover this drug for age-related  hypogonadism, also referred to as late-onset hypogonadism. We reviewed the information  we had. Your request has been denied. Your doctor can send us any new or missing  information for us to review. For this drug, you may have to meet other criteria. You can  request the drug policy for more details. You can also request other plan documents for  your review.    Prescription Claim Appeals  [facility]  [address] [address]  [place] [address]  Fax: [phone]      ([place]: [id number]) - [id number]  Qbrexza 2.4% pads  status: PA RequestCreated: May 16th, 2025 [phone]: May 16th, 2025    Message from Plan  Your PA request has been approved. Additional information will be provided in the approval communication. (Message [id number]). Authorization Expiration Date: May 16, 2026.

## 2025-05-16 NOTE — PATIENT INSTRUCTIONS
Liquid Chalk or Drysol with the pads  Use olive oil or debrox for was  Bobbi Wolf or Shari Fox  [facility] [employer]ant[employer] Annual #2 by Tucker Coughlin

## 2025-05-19 ENCOUNTER — PATIENT MESSAGE (OUTPATIENT)
Dept: FAMILY MEDICINE CLINIC | Facility: CLINIC | Age: 65
End: 2025-05-19
Payer: COMMERCIAL

## 2025-05-19 DIAGNOSIS — E29.1 HYPOGONADISM IN MALE: Primary | ICD-10-CM

## 2025-05-19 LAB
TESTOST FREE SERPL-MCNC: 3 PG/ML (ref 6.6–18.1)
TESTOST SERPL-MCNC: 117 NG/DL (ref 264–916)

## 2025-05-29 ENCOUNTER — TELEPHONE (OUTPATIENT)
Dept: FAMILY MEDICINE CLINIC | Facility: CLINIC | Age: 65
End: 2025-05-29
Payer: COMMERCIAL

## 2025-05-29 NOTE — TELEPHONE ENCOUNTER
Pt states he wants to know what's going on with his Testerone levels and if there is another factor causing his levels to be low. Also wants to know if it has anything to do with his prostate?

## 2025-05-30 RX ORDER — PEN NEEDLE, DIABETIC 31 GX5/16"
1 NEEDLE, DISPOSABLE MISCELLANEOUS TAKE AS DIRECTED
Qty: 100 EACH | Refills: 0 | Status: SHIPPED | OUTPATIENT
Start: 2025-05-30

## 2025-05-30 RX ORDER — NEEDLES, SAFETY 18GX1 1/2"
1 NEEDLE, DISPOSABLE MISCELLANEOUS
Qty: 50 EACH | Refills: 0 | Status: SHIPPED | OUTPATIENT
Start: 2025-05-30

## 2025-05-30 RX ORDER — TESTOSTERONE CYPIONATE 1000 MG/10ML
50 INJECTION, SOLUTION INTRAMUSCULAR
Qty: 5 ML | Refills: 0 | Status: SHIPPED | OUTPATIENT
Start: 2025-05-30

## 2025-06-02 NOTE — PROGRESS NOTES
Established Patient Office Visit      Patient Name: Manuel Pineda  : 1960   MRN: [id number]   Care Team: Patient Care Team:  Dallin Loza DO as PCP - General (Family Medicine)  Don Lucio MD (Urology)  Fred Villanueva MD as Consulting Physician (Gastroenterology)  Migel Vragas MD as Surgeon (Orthopedic Surgery)    Chief Complaint:    Chief Complaint   Patient presents with    Results     Follow up to review labs       History of Present Illness: Manuel Pineda is a 65 y.o. male who is here today for chief complaint.    HPI    Review labs and discuss increased sweating    This patient is accompanied by their self who contributes to the history of their care.    The following portions of the patient's history were reviewed and updated as appropriate: allergies, current medications, past family history, past medical history, past social history, past surgical history and problem list.    Subjective      Review of Systems:   Review of Systems - See HPI    Past Medical History:   Past Medical History:   Diagnosis Date    Anxiety     Arthritis of back     Cervical disc disorder     Degenerative arthritis of left knee     Injection at ortho years ago    Depression     GERD (gastroesophageal reflux disease)     HLD (hyperlipidemia)     Hypertension     Lumbosacral disc disease        Past Surgical History:   Past Surgical History:   Procedure Laterality Date    APPENDECTOMY      TONSILLECTOMY      VASECTOMY         Family History:   Family History   Problem Relation Age of Onset    Multiple myeloma Mother     Cancer Mother         multiple myeloma    Hypertension Father     Skin cancer Father     Stroke Maternal Grandfather        Social History:   Social History     Socioeconomic History    Marital status: Single   Tobacco Use    Smoking status: Former     Current packs/day: 0.00     Average packs/day: 1 pack/day for 22.0 years (22.0 ttl pk-yrs)     Types: Cigarettes      Start date: 1978     Quit date:      Years since quittin.4     Passive exposure: Never    Smokeless tobacco: Never   Vaping Use    Vaping status: Never Used   Substance and Sexual Activity    Alcohol use: Yes     Alcohol/week: 5.0 standard drinks of alcohol     Types: 5 Drinks containing 0.5 oz of alcohol per week     Comment: Moderate    Drug use: No    Sexual activity: Defer       Tobacco History:   Social History     Tobacco Use   Smoking Status Former    Current packs/day: 0.00    Average packs/day: 1 pack/day for 22.0 years (22.0 ttl pk-yrs)    Types: Cigarettes    Start date: 1978    Quit date:     Years since quittin.4    Passive exposure: Never   Smokeless Tobacco Never       Medications:   Outpatient Medications Prior to Visit   Medication Sig Dispense Refill    ALPRAZolam (XANAX) 0.5 MG tablet Take 1 tablet by mouth 2 (Two) Times a Day As Needed for Anxiety.      dicyclomine (BENTYL) 20 MG tablet Take 1 tablet by mouth Every 6 (Six) Hours. (Patient taking differently: Take 1 tablet by mouth Every 6 (Six) Hours. Pt states he takes it when he has a upset bowel movement)      lisinopril (PRINIVIL,ZESTRIL) 10 MG tablet TAKE 1 TABLET BY MOUTH DAILY 90 tablet 0    pantoprazole (PROTONIX) 40 MG EC tablet TAKE 1 TABLET BY MOUTH DAILY 90 tablet 0    rosuvastatin (CRESTOR) 10 MG tablet Take 1 tablet by mouth Daily. 90 tablet 2    venlafaxine XR (EFFEXOR-XR) 75 MG 24 hr capsule TAKE 1 CAPSULE BY MOUTH DAILY 90 capsule 1    Testosterone 20.25 MG/1.25GM (1.62%) gel Place 1 Application on the skin as directed by provider Every Morning. Rotate sites. Have levels rechecked in 3 months 112.5 g 0     No facility-administered medications prior to visit.        Allergies:   Allergies   Allergen Reactions    Hydrocodone      Itching       Objective   Objective     Physical Exam:  Vital Signs:   Vitals:    25 1355   BP: 134/88   Pulse: 88   SpO2: 95%   Weight: 111 kg (245 lb)   Height: 185.4 cm  "(72.99\")     Body mass index is 32.33 kg/m².     Physical Exam  Nursing note reviewed  Const: NAD, A&Ox4, Pleasant, Cooperative  Eyes: EOMI, no conjunctivitis  ENT: No nasal discharge present, neck supple  Cardiac: Regular rate and rhythm, no cyanosis  Resp: Respiratory rate within normal limits, no increased work of breathing, no audible wheezing or retractions noted  GI: No distention or ascites  MSK: Motor and sensation grossly intact in bilateral upper extremities  Neurologic: CN II-XII grossly intact  Psych: Appropriate mood and behavior.  Skin: Warm, dry  Procedures/Radiology     Procedures  No radiology results for the last 7 days     Assessment & Plan   Assessment / Plan      Assessment/Plan:   Problems Addressed This Visit  Diagnoses and all orders for this visit:    1. Hyperhidrosis of palms (Primary)  -     aluminum chloride (Drysol) 20 % external solution; Apply nightly for 1 week on dry, sweat-free skin (leave in place for 6-8 hours), then twice weekly at night  Dispense: 60 mL; Refill: 2  -     Glycopyrronium Tosylate 2.4 % pads; Apply 1 Application topically Daily. Use pre-moistened cloth to apply topically to affected area daily.  Dispense: 30 each; Refill: 2    2. Hypogonadism in male  -     Discontinue: Testosterone 20.25 MG/1.25GM (1.62%) gel; Place 1 Application on the skin as directed by provider Every Morning. Rotate sites. Have levels rechecked in 3 months  Dispense: 112.5 g; Refill: 5      Problem List Items Addressed This Visit          Endocrine and Metabolic    Hypogonadism in male    Overview   Historically elevated PSA, but normal biopsy with Dr. Lucio 3 years ago          Other Visit Diagnoses         Hyperhidrosis of palms    -  Primary    Relevant Medications    aluminum chloride (Drysol) 20 % external solution    Glycopyrronium Tosylate 2.4 % pads            New Medications Ordered This Visit   Medications    aluminum chloride (Drysol) 20 % external solution     Sig: Apply nightly " for 1 week on dry, sweat-free skin (leave in place for 6-8 hours), then twice weekly at night     Dispense:  60 mL     Refill:  2    Glycopyrronium Tosylate 2.4 % pads     Sig: Apply 1 Application topically Daily. Use pre-moistened cloth to apply topically to affected area daily.     Dispense:  30 each     Refill:  2      The topical testosterone is not doing well enough at increasing his testosterone level, I suspect because of his hyperhidrosis.  Recommend changing over to Depo testosterone injections    Patient Instructions   Liquid Chalk or Drysol with the pads  Use olive oil or debrox for was  Luciano, Bobbi Romero or Shari Fox  [employer] Annual #2 by Tucker Coughlin    Follow Up:   Return in about 6 months (around 11/16/2025) for Annual.        DO KACI Billings RD  [facility]  [address] [facility] [address]  Fax [phone]  Phone [phone]    Disclaimer to patients: The 21st Century Cares Act makes medical notes like these available to patients in the interest of transparency. However, please be advised that this is still a medical document. It is intended as [id number] communication. Many sections may include medical language or jargon, abbreviations, and additional verbiage that are unfamiliar or confusing. In some ways it may come across as blunt, direct, or may be summarized in order to clearly and concisely communicate the most crucial information to medical professionals. It may also include mentions of conditions that are unlikely but considered as part of the differential diagnosis, including serious disorders. These are not always discussed at length at the time of appointment because their likelihood is so low, but may be included in a medical note to make it clear what has been considered and/or ruled out as part of a work-up. Medical documents are intended to carry relevant information, facts as  evident, and the personal clinical opinion of the physician. If you have any questions regarding this medical document, please bring them to the attention of the physician at your next scheduled appointment.

## 2025-06-30 RX ORDER — ROSUVASTATIN CALCIUM 10 MG/1
10 TABLET, COATED ORAL DAILY
Qty: 90 TABLET | Refills: 2 | Status: SHIPPED | OUTPATIENT
Start: 2025-06-30

## 2025-06-30 NOTE — TELEPHONE ENCOUNTER
Rx Refill Note  Requested Prescriptions     Pending Prescriptions Disp Refills    rosuvastatin (CRESTOR) 10 MG tablet [Pharmacy Med Name: ROSUVASTATIN CALCIUM 10 MG TAB] 90 tablet 2     Sig: TAKE 1 TABLET BY MOUTH DAILY      Last office visit with prescribing clinician: 5/16/2025   Last telemedicine visit with prescribing clinician: Visit date not found   Next office visit with prescribing clinician: Visit date not found                         Would you like a call back once the refill request has been completed: [] Yes [] No    If the office needs to give you a call back, can they leave a voicemail: [] Yes [] No    Patricia Oliver MA  06/30/25, 11:23 EDT

## 2025-07-07 RX ORDER — LISINOPRIL 10 MG/1
10 TABLET ORAL DAILY
Qty: 90 TABLET | Refills: 0 | Status: SHIPPED | OUTPATIENT
Start: 2025-07-07

## 2025-07-07 NOTE — TELEPHONE ENCOUNTER
Rx Refill Note  Requested Prescriptions     Pending Prescriptions Disp Refills    lisinopril (PRINIVIL,ZESTRIL) 10 MG tablet [Pharmacy Med Name: LISINOPRIL 10 MG TABLET] 90 tablet 0     Sig: TAKE 1 TABLET BY MOUTH DAILY      Last office visit with prescribing clinician: 5/16/2025   Last telemedicine visit with prescribing clinician: Visit date not found   Next office visit with prescribing clinician: Visit date not found                         Would you like a call back once the refill request has been completed: [] Yes [] No    If the office needs to give you a call back, can they leave a voicemail: [] Yes [] No    Bety Dietrich MA  07/07/25, 08:16 EDT

## 2025-07-22 ENCOUNTER — OFFICE VISIT (OUTPATIENT)
Dept: ORTHOPEDIC SURGERY | Facility: CLINIC | Age: 65
End: 2025-07-22
Payer: COMMERCIAL

## 2025-07-22 VITALS
DIASTOLIC BLOOD PRESSURE: 80 MMHG | SYSTOLIC BLOOD PRESSURE: 138 MMHG | BODY MASS INDEX: 32.43 KG/M2 | WEIGHT: 244.7 LBS | HEIGHT: 73 IN

## 2025-07-22 DIAGNOSIS — M75.81 RIGHT ROTATOR CUFF TENDINITIS: Primary | ICD-10-CM

## 2025-07-22 RX ORDER — DEXAMETHASONE SODIUM PHOSPHATE 4 MG/ML
4 INJECTION, SOLUTION INTRA-ARTICULAR; INTRALESIONAL; INTRAMUSCULAR; INTRAVENOUS; SOFT TISSUE
Status: COMPLETED | OUTPATIENT
Start: 2025-07-22 | End: 2025-07-22

## 2025-07-22 RX ORDER — LIDOCAINE HYDROCHLORIDE 10 MG/ML
4 INJECTION, SOLUTION EPIDURAL; INFILTRATION; INTRACAUDAL; PERINEURAL
Status: COMPLETED | OUTPATIENT
Start: 2025-07-22 | End: 2025-07-22

## 2025-07-22 RX ADMIN — DEXAMETHASONE SODIUM PHOSPHATE 4 MG: 4 INJECTION, SOLUTION INTRA-ARTICULAR; INTRALESIONAL; INTRAMUSCULAR; INTRAVENOUS; SOFT TISSUE at 08:19

## 2025-07-22 RX ADMIN — LIDOCAINE HYDROCHLORIDE 4 ML: 10 INJECTION, SOLUTION EPIDURAL; INFILTRATION; INTRACAUDAL; PERINEURAL at 08:19

## 2025-07-22 NOTE — PROGRESS NOTES
[facility] Office Follow Up Visit     Office Follow Up      Date: 07/22/2025   Patient Name: Manuel Pineda  MRN: [id number]  YOB: 1960    Referring Physician: No ref. provider found     Chief Complaint:   Chief Complaint   Patient presents with   • Follow-up     11 month follow up -Right shoulder pain, unspecified chronicity       History of Present Illness: Manuel Pineda is a 65 y.o. male who returns to clinic today for follow up on right shoulder pain. His pain is a 6 /10 on the pain scale. Patient has tried the following previous treatments anti-inflammatories. He mentions current symptoms of pain  and giving Way . He states that these treatments have Improved.      Subjective     Review of Systems: Review of Systems   Constitutional:  Negative for chills, fever, unexpected weight gain and unexpected weight loss.   HENT:  Negative for congestion, postnasal drip and rhinorrhea.    Eyes:  Negative for blurred vision.   Respiratory:  Negative for shortness of breath.    Cardiovascular:  Negative for leg swelling.   Gastrointestinal:  Negative for abdominal pain, nausea and vomiting.   Genitourinary:  Negative for difficulty urinating.   Musculoskeletal:  Positive for arthralgias. Negative for gait problem, joint swelling and myalgias.   Skin:  Negative for skin lesions and wound.   Neurological:  Negative for dizziness, weakness, light-headedness and numbness.   Hematological:  Does not bruise/bleed easily.   Psychiatric/Behavioral:  Negative for depressed mood.       Medications:   Current Outpatient Medications:   •  Alcohol Swabs (Alcohol Prep) pads, Use 1 each Take As Directed. Use to clean injection site before injection, Disp: 100 each, Rfl: 0  •  ALPRAZolam (XANAX) 0.5 MG tablet, Take 1 tablet by mouth 2 (Two) Times a Day As Needed for Anxiety., Disp: , Rfl:   •  aluminum chloride (Drysol) 20 % external solution, Apply nightly for 1  "week on dry, sweat-free skin (leave in place for 6-8 hours), then twice weekly at night, Disp: 60 mL, Rfl: 2  •  dicyclomine (BENTYL) 20 MG tablet, Take 1 tablet by mouth Every 6 (Six) Hours. (Patient taking differently: Take 1 tablet by mouth Every 6 (Six) Hours. Pt states he takes it when he has a upset bowel movement), Disp: , Rfl:   •  Glycopyrronium Tosylate 2.4 % pads, Apply 1 Application topically Daily. Use pre-moistened cloth to apply topically to affected area daily., Disp: 30 each, Rfl: 2  •  lisinopril (PRINIVIL,ZESTRIL) 10 MG tablet, TAKE 1 TABLET BY MOUTH DAILY, Disp: 90 tablet, Rfl: 0  •  Needle, Disp, (BD Eclipse Needle) 23G X 1\" misc, Use 1 each Every 7 (Seven) Days. Use to injection testosterone as directed., Disp: 50 each, Rfl: 0  •  pantoprazole (PROTONIX) 40 MG EC tablet, TAKE 1 TABLET BY MOUTH DAILY, Disp: 90 tablet, Rfl: 0  •  rosuvastatin (CRESTOR) 10 MG tablet, TAKE 1 TABLET BY MOUTH DAILY, Disp: 90 tablet, Rfl: 2  •  sulfamethoxazole-trimethoprim (BACTRIM DS,SEPTRA DS) 800-160 MG per tablet, Take 1 tablet by mouth 2 (Two) Times a Day., Disp: 20 tablet, Rfl: 0  •  testosterone cypionate (DEPO-TESTOSTERONE) 100 MG/ML solution injection, Inject 0.5 mL into the appropriate muscle as directed by prescriber Every 7 (Seven) Days. Inject into outer thigh or gluteal muscle., Disp: 5 mL, Rfl: 0  •  venlafaxine XR (EFFEXOR-XR) 75 MG 24 hr capsule, TAKE 1 CAPSULE BY MOUTH DAILY, Disp: 90 capsule, Rfl: 1    Allergies:   Allergies   Allergen Reactions   • Hydrocodone      Itching       I have reviewed and updated the patient's chief complaint, history of present illness, review of systems, past medical history, surgical history, family history, social history, medications and allergy list as appropriate.     Objective      Vital Signs:   Vitals:    07/22/25 0815   BP: 138/80   Weight: 111 kg (244 lb 11.2 oz)   Height: 185.4 cm (73\")     Body mass index is 32.28 kg/m².         Ortho Exam:  ***    Results " Review:   Imaging Results (Last 24 Hours)       ** No results found for the last 24 hours. **            Procedures    Assessment / Plan      Assessment/Plan:   There are no diagnoses linked to this encounter.  ***    Follow Up:   No follow-ups on file.      Jun Vargas MD  [facility]

## 2025-07-22 NOTE — PROGRESS NOTES
Procedure   - Large Joint Arthrocentesis: R subacromial bursa on 7/22/2025 8:19 AM  Indications: pain  Details: 21 G needle, posterior approach  Medications: 4 mL lidocaine PF 1% 1 %; 4 mg dexAMETHasone 4 MG/ML  Outcome: tolerated well, no immediate complications  Procedure, treatment alternatives, risks and benefits explained, specific risks discussed. Consent was given by the patient. Immediately prior to procedure a time out was called to verify the correct patient, procedure, equipment, support staff and site/side marked as required. Patient was prepped and draped in the usual sterile fashion.        65-year-old male presents with right shoulder pain from right rotator cuff tendinitis.  Patient is here for right subacromial bursa corticosteroid injection.  Previous office documentation and images were personally reviewed prior to the visit.  We discussed them in detail how they correlate to experienced symptoms.  I explained the procedure in detail.  I answered all questions to the best my ability.  Risks and benefits as well as post procedure instructions were provided.  Patient elected to proceed and tolerated this procedure well.  See procedure note.  Follow-up with me will be on an as-needed basis.

## 2025-07-31 RX ORDER — PANTOPRAZOLE SODIUM 40 MG/1
40 TABLET, DELAYED RELEASE ORAL DAILY
Qty: 90 TABLET | Refills: 0 | Status: SHIPPED | OUTPATIENT
Start: 2025-07-31

## 2025-08-23 DIAGNOSIS — E29.1 HYPOGONADISM IN MALE: ICD-10-CM

## 2025-08-26 RX ORDER — TESTOSTERONE CYPIONATE 200 MG/ML
INJECTION, SOLUTION INTRAMUSCULAR
Qty: 3 ML | Refills: 2 | Status: SHIPPED | OUTPATIENT
Start: 2025-08-26